# Patient Record
Sex: FEMALE | NOT HISPANIC OR LATINO | Employment: FULL TIME | URBAN - METROPOLITAN AREA
[De-identification: names, ages, dates, MRNs, and addresses within clinical notes are randomized per-mention and may not be internally consistent; named-entity substitution may affect disease eponyms.]

---

## 2021-11-28 ENCOUNTER — HOSPITAL ENCOUNTER (INPATIENT)
Facility: CLINIC | Age: 36
LOS: 3 days | Discharge: HOME | End: 2021-12-01
Attending: EMERGENCY MEDICINE | Admitting: STUDENT IN AN ORGANIZED HEALTH CARE EDUCATION/TRAINING PROGRAM
Payer: COMMERCIAL

## 2021-11-28 ENCOUNTER — APPOINTMENT (OUTPATIENT)
Dept: CT IMAGING | Facility: CLINIC | Age: 36
End: 2021-11-28
Attending: EMERGENCY MEDICINE
Payer: COMMERCIAL

## 2021-11-28 DIAGNOSIS — D50.8 OTHER IRON DEFICIENCY ANEMIA: Primary | ICD-10-CM

## 2021-11-28 DIAGNOSIS — J12.82 PNEUMONIA DUE TO 2019 NOVEL CORONAVIRUS: ICD-10-CM

## 2021-11-28 DIAGNOSIS — J96.01 ACUTE RESPIRATORY FAILURE WITH HYPOXIA (H): ICD-10-CM

## 2021-11-28 DIAGNOSIS — U07.1 PNEUMONIA DUE TO 2019 NOVEL CORONAVIRUS: ICD-10-CM

## 2021-11-28 LAB
ABO/RH(D): NORMAL
ALBUMIN SERPL-MCNC: 3.2 G/DL (ref 3.4–5)
ALBUMIN UR-MCNC: 30 MG/DL
ALP SERPL-CCNC: 42 U/L (ref 40–150)
ALT SERPL W P-5'-P-CCNC: 25 U/L (ref 0–50)
ANION GAP SERPL CALCULATED.3IONS-SCNC: 7 MMOL/L (ref 3–14)
APPEARANCE UR: CLEAR
AST SERPL W P-5'-P-CCNC: 33 U/L (ref 0–45)
BASOPHILS # BLD AUTO: 0 10E3/UL (ref 0–0.2)
BASOPHILS NFR BLD AUTO: 0 %
BILIRUB DIRECT SERPL-MCNC: 0.2 MG/DL (ref 0–0.2)
BILIRUB SERPL-MCNC: 0.4 MG/DL (ref 0.2–1.3)
BILIRUB UR QL STRIP: NEGATIVE
BUN SERPL-MCNC: 5 MG/DL (ref 7–30)
CALCIUM SERPL-MCNC: 8.1 MG/DL (ref 8.5–10.1)
CHLORIDE BLD-SCNC: 103 MMOL/L (ref 94–109)
CO2 SERPL-SCNC: 30 MMOL/L (ref 20–32)
COLOR UR AUTO: ABNORMAL
CREAT SERPL-MCNC: 0.7 MG/DL (ref 0.52–1.04)
CRP SERPL-MCNC: 95.7 MG/L (ref 0–8)
D DIMER PPP FEU-MCNC: 1.38 UG/ML FEU (ref 0–0.5)
EOSINOPHIL # BLD AUTO: 0 10E3/UL (ref 0–0.7)
EOSINOPHIL NFR BLD AUTO: 0 %
ERYTHROCYTE [DISTWIDTH] IN BLOOD BY AUTOMATED COUNT: 18.2 % (ref 10–15)
FLUAV RNA SPEC QL NAA+PROBE: NEGATIVE
FLUBV RNA RESP QL NAA+PROBE: NEGATIVE
GFR SERPL CREATININE-BSD FRML MDRD: >90 ML/MIN/1.73M2
GLUCOSE BLD-MCNC: 86 MG/DL (ref 70–99)
GLUCOSE UR STRIP-MCNC: NEGATIVE MG/DL
HCG UR QL: NEGATIVE
HCT VFR BLD AUTO: 28.1 % (ref 35–47)
HGB BLD-MCNC: 8.1 G/DL (ref 11.7–15.7)
HGB UR QL STRIP: ABNORMAL
IMM GRANULOCYTES # BLD: 0 10E3/UL
IMM GRANULOCYTES NFR BLD: 1 %
IRON SATN MFR SERPL: 4 % (ref 15–46)
IRON SERPL-MCNC: 12 UG/DL (ref 35–180)
KETONES UR STRIP-MCNC: NEGATIVE MG/DL
LEUKOCYTE ESTERASE UR QL STRIP: NEGATIVE
LYMPHOCYTES # BLD AUTO: 1.4 10E3/UL (ref 0.8–5.3)
LYMPHOCYTES NFR BLD AUTO: 23 %
MCH RBC QN AUTO: 21 PG (ref 26.5–33)
MCHC RBC AUTO-ENTMCNC: 28.8 G/DL (ref 31.5–36.5)
MCV RBC AUTO: 73 FL (ref 78–100)
MONOCYTES # BLD AUTO: 0.3 10E3/UL (ref 0–1.3)
MONOCYTES NFR BLD AUTO: 5 %
NEUTROPHILS # BLD AUTO: 4.3 10E3/UL (ref 1.6–8.3)
NEUTROPHILS NFR BLD AUTO: 71 %
NITRATE UR QL: NEGATIVE
NRBC # BLD AUTO: 0 10E3/UL
NRBC BLD AUTO-RTO: 0 /100
PH UR STRIP: 7 [PH] (ref 5–7)
PLATELET # BLD AUTO: 269 10E3/UL (ref 150–450)
POTASSIUM BLD-SCNC: 3.2 MMOL/L (ref 3.4–5.3)
PROT SERPL-MCNC: 8 G/DL (ref 6.8–8.8)
RBC # BLD AUTO: 3.86 10E6/UL (ref 3.8–5.2)
RBC URINE: <1 /HPF
SARS-COV-2 RNA RESP QL NAA+PROBE: POSITIVE
SODIUM SERPL-SCNC: 140 MMOL/L (ref 133–144)
SP GR UR STRIP: 1.01 (ref 1–1.03)
SPECIMEN EXPIRATION DATE: NORMAL
SQUAMOUS EPITHELIAL: 1 /HPF
TIBC SERPL-MCNC: 332 UG/DL (ref 240–430)
TROPONIN I SERPL-MCNC: <0.015 UG/L (ref 0–0.04)
UROBILINOGEN UR STRIP-MCNC: NORMAL MG/DL
WBC # BLD AUTO: 6 10E3/UL (ref 4–11)
WBC URINE: 1 /HPF

## 2021-11-28 PROCEDURE — 81025 URINE PREGNANCY TEST: CPT | Performed by: EMERGENCY MEDICINE

## 2021-11-28 PROCEDURE — 36415 COLL VENOUS BLD VENIPUNCTURE: CPT | Performed by: EMERGENCY MEDICINE

## 2021-11-28 PROCEDURE — 250N000011 HC RX IP 250 OP 636: Performed by: EMERGENCY MEDICINE

## 2021-11-28 PROCEDURE — 250N000009 HC RX 250: Performed by: EMERGENCY MEDICINE

## 2021-11-28 PROCEDURE — 84484 ASSAY OF TROPONIN QUANT: CPT | Performed by: STUDENT IN AN ORGANIZED HEALTH CARE EDUCATION/TRAINING PROGRAM

## 2021-11-28 PROCEDURE — 87636 SARSCOV2 & INF A&B AMP PRB: CPT | Performed by: EMERGENCY MEDICINE

## 2021-11-28 PROCEDURE — 85025 COMPLETE CBC W/AUTO DIFF WBC: CPT | Performed by: EMERGENCY MEDICINE

## 2021-11-28 PROCEDURE — 85379 FIBRIN DEGRADATION QUANT: CPT | Performed by: EMERGENCY MEDICINE

## 2021-11-28 PROCEDURE — 99222 1ST HOSP IP/OBS MODERATE 55: CPT | Mod: AI | Performed by: STUDENT IN AN ORGANIZED HEALTH CARE EDUCATION/TRAINING PROGRAM

## 2021-11-28 PROCEDURE — 96365 THER/PROPH/DIAG IV INF INIT: CPT

## 2021-11-28 PROCEDURE — 86140 C-REACTIVE PROTEIN: CPT | Performed by: STUDENT IN AN ORGANIZED HEALTH CARE EDUCATION/TRAINING PROGRAM

## 2021-11-28 PROCEDURE — 96375 TX/PRO/DX INJ NEW DRUG ADDON: CPT

## 2021-11-28 PROCEDURE — 81001 URINALYSIS AUTO W/SCOPE: CPT | Performed by: EMERGENCY MEDICINE

## 2021-11-28 PROCEDURE — XW033E5 INTRODUCTION OF REMDESIVIR ANTI-INFECTIVE INTO PERIPHERAL VEIN, PERCUTANEOUS APPROACH, NEW TECHNOLOGY GROUP 5: ICD-10-PCS | Performed by: STUDENT IN AN ORGANIZED HEALTH CARE EDUCATION/TRAINING PROGRAM

## 2021-11-28 PROCEDURE — 120N000004 HC R&B MS OVERFLOW

## 2021-11-28 PROCEDURE — 83550 IRON BINDING TEST: CPT | Performed by: EMERGENCY MEDICINE

## 2021-11-28 PROCEDURE — 71275 CT ANGIOGRAPHY CHEST: CPT

## 2021-11-28 PROCEDURE — 80076 HEPATIC FUNCTION PANEL: CPT | Performed by: STUDENT IN AN ORGANIZED HEALTH CARE EDUCATION/TRAINING PROGRAM

## 2021-11-28 PROCEDURE — 99285 EMERGENCY DEPT VISIT HI MDM: CPT | Mod: 25

## 2021-11-28 PROCEDURE — C9803 HOPD COVID-19 SPEC COLLECT: HCPCS

## 2021-11-28 PROCEDURE — 80048 BASIC METABOLIC PNL TOTAL CA: CPT | Performed by: EMERGENCY MEDICINE

## 2021-11-28 RX ORDER — IOPAMIDOL 755 MG/ML
500 INJECTION, SOLUTION INTRAVASCULAR ONCE
Status: COMPLETED | OUTPATIENT
Start: 2021-11-28 | End: 2021-11-28

## 2021-11-28 RX ORDER — ALBUTEROL SULFATE 90 UG/1
2 AEROSOL, METERED RESPIRATORY (INHALATION) 4 TIMES DAILY
Status: DISCONTINUED | OUTPATIENT
Start: 2021-11-29 | End: 2021-11-29

## 2021-11-28 RX ORDER — FERROUS SULFATE 325(65) MG
325 TABLET ORAL DAILY
Status: DISCONTINUED | OUTPATIENT
Start: 2021-11-29 | End: 2021-12-01 | Stop reason: HOSPADM

## 2021-11-28 RX ORDER — ACETAMINOPHEN 325 MG/1
325-650 TABLET ORAL EVERY 6 HOURS PRN
Status: DISCONTINUED | OUTPATIENT
Start: 2021-11-28 | End: 2021-12-01 | Stop reason: HOSPADM

## 2021-11-28 RX ORDER — ACETAMINOPHEN 325 MG/1
325-650 TABLET ORAL EVERY 6 HOURS PRN
COMMUNITY

## 2021-11-28 RX ORDER — LIDOCAINE 40 MG/G
CREAM TOPICAL
Status: DISCONTINUED | OUTPATIENT
Start: 2021-11-28 | End: 2021-12-01 | Stop reason: HOSPADM

## 2021-11-28 RX ORDER — ONDANSETRON 2 MG/ML
4 INJECTION INTRAMUSCULAR; INTRAVENOUS EVERY 6 HOURS PRN
Status: DISCONTINUED | OUTPATIENT
Start: 2021-11-28 | End: 2021-12-01 | Stop reason: HOSPADM

## 2021-11-28 RX ORDER — KETOROLAC TROMETHAMINE 15 MG/ML
15 INJECTION, SOLUTION INTRAMUSCULAR; INTRAVENOUS ONCE
Status: COMPLETED | OUTPATIENT
Start: 2021-11-28 | End: 2021-11-28

## 2021-11-28 RX ORDER — ONDANSETRON 4 MG/1
4 TABLET, ORALLY DISINTEGRATING ORAL EVERY 6 HOURS PRN
Status: DISCONTINUED | OUTPATIENT
Start: 2021-11-28 | End: 2021-12-01 | Stop reason: HOSPADM

## 2021-11-28 RX ORDER — DEXAMETHASONE SODIUM PHOSPHATE 10 MG/ML
6 INJECTION, SOLUTION INTRAMUSCULAR; INTRAVENOUS ONCE
Status: COMPLETED | OUTPATIENT
Start: 2021-11-28 | End: 2021-11-28

## 2021-11-28 RX ORDER — IBUPROFEN 200 MG
200 TABLET ORAL EVERY 4 HOURS PRN
Status: ON HOLD | COMMUNITY
End: 2021-12-01

## 2021-11-28 RX ORDER — ALBUTEROL SULFATE 90 UG/1
2 AEROSOL, METERED RESPIRATORY (INHALATION) EVERY 4 HOURS PRN
Status: DISCONTINUED | OUTPATIENT
Start: 2021-11-28 | End: 2021-12-01 | Stop reason: HOSPADM

## 2021-11-28 RX ORDER — FAMOTIDINE 20 MG/1
20 TABLET, FILM COATED ORAL 2 TIMES DAILY
Status: DISCONTINUED | OUTPATIENT
Start: 2021-11-28 | End: 2021-12-01 | Stop reason: HOSPADM

## 2021-11-28 RX ADMIN — KETOROLAC TROMETHAMINE 15 MG: 15 INJECTION, SOLUTION INTRAMUSCULAR; INTRAVENOUS at 19:40

## 2021-11-28 RX ADMIN — IOPAMIDOL 80 ML: 755 INJECTION, SOLUTION INTRAVENOUS at 19:00

## 2021-11-28 RX ADMIN — DEXAMETHASONE SODIUM PHOSPHATE 6 MG: 10 INJECTION INTRAMUSCULAR; INTRAVENOUS at 18:16

## 2021-11-28 RX ADMIN — SODIUM CHLORIDE 100 ML: 9 INJECTION, SOLUTION INTRAVENOUS at 19:00

## 2021-11-28 ASSESSMENT — ENCOUNTER SYMPTOMS
MYALGIAS: 1
FEVER: 1
BACK PAIN: 1
COUGH: 1
SHORTNESS OF BREATH: 1

## 2021-11-28 ASSESSMENT — ACTIVITIES OF DAILY LIVING (ADL)
ADLS_ACUITY_SCORE: 12
ADLS_ACUITY_SCORE: 12

## 2021-11-28 NOTE — LETTER
[place] [address] [place] [address]  [phone] December 1, 2021    RE:  Nola Srinivasan                                                                                                                                                       [address] [facility] [address]  [place] [address]            To whom it may concern:    Nola Srinivasan is under my professional care for    Pneumonia due to 2019 novel coronavirus  Acute respiratory failure with hypoxia (H)  Other iron deficiency anemia She  may return to work with the following: The employee is UNABLE to return to work until completes the quarantine time at home.  Total of 14 days from 11/28/2021.  She was under my care during hospitalization from 11/28/2021 to 12/1/2021      Sincerely,        Manisha Howard MD

## 2021-11-28 NOTE — ED PROVIDER NOTES
History   Chief Complaint:  Low oxygen      HPI   Nola Srinivasan is a 36 year old female who presents with low oxygen and cold symptoms. The patient states that for the past 6 days she has been experiencing fever, shortness of breath, cough, and myalgias. She states that her mother passed away from COVID-19 10 days ago. She mentions that her friends and family gathered after her mother's passing and she is unsure if anyone was sick. She attempted to take ibuprofen and Tylenol with mild relief. She also has been having low to mid back pain for the past 2-3 days and mentions that it feels similar to her pneumonia diagnosis in the past. The pain is triggered when coughing and no changes when she turns to the side. She visited Urgent Care today and they found her O2 stats to be in the 80s, so she was sent to the ER. Denies any history of blood clots or use of birth control. Last period is today. Of note, the patient is not vaccinated against COVID-19.      Review of Systems   Constitutional: Positive for fever.   Respiratory: Positive for cough and shortness of breath.    Musculoskeletal: Positive for back pain and myalgias.   All other systems reviewed and are negative.        Allergies:  Penicillin G    Medications:  The patient is not currently taking any prescribed medications.    Past Medical History:     The patient denies past medical history.       Social History:  Patient presents alone     Physical Exam     Patient Vitals for the past 24 hrs:   BP Temp Temp src Pulse Resp SpO2 Weight   11/28/21 2000 116/68 -- -- 76 -- 90 % --   11/28/21 1945 113/71 -- -- -- -- 93 % --   11/28/21 1848 -- -- -- -- -- 94 % --   11/28/21 1845 122/73 -- -- -- -- -- --   11/28/21 1815 131/79 -- -- -- -- 92 % --   11/28/21 1800 113/71 -- -- 80 -- 96 % --   11/28/21 1745 115/72 -- -- -- -- 96 % --   11/28/21 1720 130/82 -- -- 86 -- 95 % --   11/28/21 1712 -- -- -- -- -- 94 % --   11/28/21 1711 -- -- -- -- -- (!) 84 % --   11/28/21  1710 132/78 -- -- 83 -- -- --   11/28/21 1630 (!) 143/84 -- -- -- -- 96 % --   11/28/21 1628 -- 98.4  F (36.9  C) Oral -- -- 96 % --   11/28/21 1625 (!) 143/84 -- -- 93 -- -- --   11/28/21 1610 -- -- -- -- -- 98 % --   11/28/21 1609 -- -- -- -- -- -- 89.8 kg (198 lb)   11/28/21 1608 (!) 149/87 97.3  F (36.3  C) Temporal 94 20 (!) 85 % --       Physical Exam  Vitals reviewed.   Constitutional:       Appearance: She is obese.   HENT:      Head: Normocephalic.      Mouth/Throat:      Mouth: Mucous membranes are moist.   Eyes:      Pupils: Pupils are equal, round, and reactive to light.   Cardiovascular:      Rate and Rhythm: Normal rate and regular rhythm.   Pulmonary:      Effort: Pulmonary effort is normal.      Breath sounds: Normal breath sounds.   Musculoskeletal:         General: Normal range of motion.   Skin:     General: Skin is warm.      Capillary Refill: Capillary refill takes less than 2 seconds.   Neurological:      General: No focal deficit present.      Mental Status: She is alert.   Psychiatric:      Comments: Anxious recent loss of mother due to COVID-19.           Emergency Department Course     Imaging:  CT Chest Pulmonary Embolism w Contrast   Final Result   IMPRESSION:   1.  Diffuse moderate to extensive areas of groundglass opacity and developing consolidation throughout both hemithoraces most prominent towards the lower lung fields most consistent with COVID pneumonia. Follow-up in 3 months recommended to ensure    resolution and exclude other pathology.   2.  No pulmonary embolus aortic aneurysm or dissection.      Commonly reported imaging features of (COVID-19) pneumonia are present. Influenza pneumonia and organizing pneumonia as can be seen in the setting of drug toxicity and connective tissue disease can cause a similar imaging pattern.        Report per radiology    Laboratory:  Labs Ordered and Resulted from Time of ED Arrival to Time of ED Departure   BASIC METABOLIC PANEL - Abnormal        Result Value    Sodium 140      Potassium 3.2 (*)     Chloride 103      Carbon Dioxide (CO2) 30      Anion Gap 7      Urea Nitrogen 5 (*)     Creatinine 0.70      Calcium 8.1 (*)     Glucose 86      GFR Estimate >90     D DIMER QUANTITATIVE - Abnormal    D-Dimer Quantitative 1.38 (*)    ROUTINE UA WITH MICROSCOPIC REFLEX TO CULTURE - Abnormal    Color Urine Light Yellow      Appearance Urine Clear      Glucose Urine Negative      Bilirubin Urine Negative      Ketones Urine Negative      Specific Gravity Urine 1.010      Blood Urine Trace (*)     pH Urine 7.0      Protein Albumin Urine 30  (*)     Urobilinogen Urine Normal      Nitrite Urine Negative      Leukocyte Esterase Urine Negative      RBC Urine <1      WBC Urine 1      Squamous Epithelials Urine 1     INFLUENZA A/B & SARS-COV2 PCR MULTIPLEX - Abnormal    Influenza A PCR Negative      Influenza B PCR Negative      SARS CoV2 PCR Positive (*)    CBC WITH PLATELETS AND DIFFERENTIAL - Abnormal    WBC Count 6.0      RBC Count 3.86      Hemoglobin 8.1 (*)     Hematocrit 28.1 (*)     MCV 73 (*)     MCH 21.0 (*)     MCHC 28.8 (*)     RDW 18.2 (*)     Platelet Count 269      % Neutrophils 71      % Lymphocytes 23      % Monocytes 5      % Eosinophils 0      % Basophils 0      % Immature Granulocytes 1      NRBCs per 100 WBC 0      Absolute Neutrophils 4.3      Absolute Lymphocytes 1.4      Absolute Monocytes 0.3      Absolute Eosinophils 0.0      Absolute Basophils 0.0      Absolute Immature Granulocytes 0.0      Absolute NRBCs 0.0     IRON AND IRON BINDING CAPACITY - Abnormal    Iron 12 (*)     Iron Binding Capacity 332      Iron Sat Index 4 (*)    HEPATIC FUNCTION PANEL - Abnormal    Bilirubin Total 0.4      Bilirubin Direct 0.2      Protein Total 8.0      Albumin 3.2 (*)     Alkaline Phosphatase 42      AST 33      ALT 25     HCG QUALITATIVE URINE - Normal    hCG Urine Qualitative Negative         Emergency Department Course:  Reviewed:  I reviewed nursing  notes, vitals and past medical history    Assessments:  1716 I obtained history and examined the patient as noted above.   2019 I rechecked the patient and explained findings.     Consults:  2102 I spoke with Dr. Mares, hospitalist, about the patient's findings and will be accepted for admission.    Interventions:  1816 Decadron, 6 mg, IV     Disposition:  The patient was admitted to the hospital under the care of Dr. Mares.     Impression & Plan     CMS Diagnoses: None    Medical Decision Making:  Patient presents with hypoxemia in the setting of COVID-19.  Patient is unvaccinated and arrives with sats in the 80s.  D-dimer is elevated CT is performed evaluate for VTE in the setting of COVID-19 this was negative but does identify diffuse infiltrates.  Based on her presentation recommend steroids and admission patient is on day 6 or 7 as far as we can tell based on her length of time of illness.  Clinical concerns about worsening and deterioration and therefore do not feel patient at this time is a candidate for discharge home on home oxygen.  Patient was explained these findings were admitted as an inpatient.      Diagnosis:    ICD-10-CM    1. Pneumonia due to 2019 novel coronavirus  U07.1     J12.82    2. Acute respiratory failure with hypoxia (H)  J96.01      Scribe Disclosure:  I, Sarita Zia, am serving as a scribe at 4:41 PM on 11/28/2021 to document services personally performed by Guilherme Powers MD based on my observations and the provider's statements to me.            Guilherme Powers MD  11/30/21 0715

## 2021-11-28 NOTE — ED TRIAGE NOTES
Patient has had cold symptoms for about one week. Patient went to UC and found O2 to be 86% so patient was referred to ER. Patient O2 found to be 85% on room air in triage - placed on 2L O2 with quick recovery to 100%. She has had a fever of 101 PTA. Patient states she has not been vaccinated for COVID 19. ABCs intact.     Last took Tylenol at 1030  Last took Ibuprofen at 0400

## 2021-11-29 LAB
ABO/RH(D): NORMAL
ALBUMIN SERPL-MCNC: 2.9 G/DL (ref 3.4–5)
ALP SERPL-CCNC: 39 U/L (ref 40–150)
ALT SERPL W P-5'-P-CCNC: 23 U/L (ref 0–50)
ANION GAP SERPL CALCULATED.3IONS-SCNC: 3 MMOL/L (ref 3–14)
ANION GAP SERPL CALCULATED.3IONS-SCNC: 5 MMOL/L (ref 3–14)
AST SERPL W P-5'-P-CCNC: 32 U/L (ref 0–45)
BASOPHILS # BLD MANUAL: 0 10E3/UL (ref 0–0.2)
BASOPHILS NFR BLD MANUAL: 0 %
BILIRUB SERPL-MCNC: 0.5 MG/DL (ref 0.2–1.3)
BUN SERPL-MCNC: 6 MG/DL (ref 7–30)
BUN SERPL-MCNC: 7 MG/DL (ref 7–30)
CALCIUM SERPL-MCNC: 7.6 MG/DL (ref 8.5–10.1)
CALCIUM SERPL-MCNC: 8 MG/DL (ref 8.5–10.1)
CHLORIDE BLD-SCNC: 104 MMOL/L (ref 94–109)
CHLORIDE BLD-SCNC: 105 MMOL/L (ref 94–109)
CO2 SERPL-SCNC: 28 MMOL/L (ref 20–32)
CO2 SERPL-SCNC: 32 MMOL/L (ref 20–32)
CREAT SERPL-MCNC: 0.59 MG/DL (ref 0.52–1.04)
CREAT SERPL-MCNC: 0.63 MG/DL (ref 0.52–1.04)
CRP SERPL-MCNC: 81.8 MG/L (ref 0–8)
D DIMER PPP FEU-MCNC: 1.13 UG/ML FEU (ref 0–0.5)
EOSINOPHIL # BLD MANUAL: 0 10E3/UL (ref 0–0.7)
EOSINOPHIL NFR BLD MANUAL: 0 %
ERYTHROCYTE [DISTWIDTH] IN BLOOD BY AUTOMATED COUNT: 18.2 % (ref 10–15)
ERYTHROCYTE [DISTWIDTH] IN BLOOD BY AUTOMATED COUNT: 18.3 % (ref 10–15)
GFR SERPL CREATININE-BSD FRML MDRD: >90 ML/MIN/1.73M2
GFR SERPL CREATININE-BSD FRML MDRD: >90 ML/MIN/1.73M2
GLUCOSE BLD-MCNC: 120 MG/DL (ref 70–99)
GLUCOSE BLD-MCNC: 157 MG/DL (ref 70–99)
HCT VFR BLD AUTO: 26.3 % (ref 35–47)
HCT VFR BLD AUTO: 27.4 % (ref 35–47)
HGB BLD-MCNC: 7.3 G/DL (ref 11.7–15.7)
HGB BLD-MCNC: 7.6 G/DL (ref 11.7–15.7)
LYMPHOCYTES # BLD MANUAL: 0.6 10E3/UL (ref 0.8–5.3)
LYMPHOCYTES NFR BLD MANUAL: 12 %
MCH RBC QN AUTO: 20.5 PG (ref 26.5–33)
MCH RBC QN AUTO: 20.6 PG (ref 26.5–33)
MCHC RBC AUTO-ENTMCNC: 27.7 G/DL (ref 31.5–36.5)
MCHC RBC AUTO-ENTMCNC: 27.8 G/DL (ref 31.5–36.5)
MCV RBC AUTO: 74 FL (ref 78–100)
MCV RBC AUTO: 74 FL (ref 78–100)
MONOCYTES # BLD MANUAL: 0.1 10E3/UL (ref 0–1.3)
MONOCYTES NFR BLD MANUAL: 2 %
NEUTROPHILS # BLD MANUAL: 4 10E3/UL (ref 1.6–8.3)
NEUTROPHILS NFR BLD MANUAL: 86 %
PLAT MORPH BLD: ABNORMAL
PLATELET # BLD AUTO: 262 10E3/UL (ref 150–450)
PLATELET # BLD AUTO: 267 10E3/UL (ref 150–450)
POTASSIUM BLD-SCNC: 3.3 MMOL/L (ref 3.4–5.3)
POTASSIUM BLD-SCNC: 3.4 MMOL/L (ref 3.4–5.3)
POTASSIUM BLD-SCNC: 3.5 MMOL/L (ref 3.4–5.3)
POTASSIUM BLD-SCNC: 3.7 MMOL/L (ref 3.4–5.3)
PROT SERPL-MCNC: 7.4 G/DL (ref 6.8–8.8)
RBC # BLD AUTO: 3.55 10E6/UL (ref 3.8–5.2)
RBC # BLD AUTO: 3.7 10E6/UL (ref 3.8–5.2)
RBC MORPH BLD: ABNORMAL
SODIUM SERPL-SCNC: 138 MMOL/L (ref 133–144)
SODIUM SERPL-SCNC: 139 MMOL/L (ref 133–144)
SPECIMEN EXPIRATION DATE: NORMAL
TROPONIN I SERPL HS-MCNC: 8 NG/L
WBC # BLD AUTO: 3.6 10E3/UL (ref 4–11)
WBC # BLD AUTO: 4.7 10E3/UL (ref 4–11)

## 2021-11-29 PROCEDURE — 85027 COMPLETE CBC AUTOMATED: CPT | Performed by: STUDENT IN AN ORGANIZED HEALTH CARE EDUCATION/TRAINING PROGRAM

## 2021-11-29 PROCEDURE — 36415 COLL VENOUS BLD VENIPUNCTURE: CPT | Performed by: STUDENT IN AN ORGANIZED HEALTH CARE EDUCATION/TRAINING PROGRAM

## 2021-11-29 PROCEDURE — 250N000013 HC RX MED GY IP 250 OP 250 PS 637: Performed by: STUDENT IN AN ORGANIZED HEALTH CARE EDUCATION/TRAINING PROGRAM

## 2021-11-29 PROCEDURE — 86901 BLOOD TYPING SEROLOGIC RH(D): CPT | Performed by: EMERGENCY MEDICINE

## 2021-11-29 PROCEDURE — 250N000009 HC RX 250: Performed by: STUDENT IN AN ORGANIZED HEALTH CARE EDUCATION/TRAINING PROGRAM

## 2021-11-29 PROCEDURE — 99233 SBSQ HOSP IP/OBS HIGH 50: CPT | Performed by: INTERNAL MEDICINE

## 2021-11-29 PROCEDURE — 250N000013 HC RX MED GY IP 250 OP 250 PS 637: Performed by: INTERNAL MEDICINE

## 2021-11-29 PROCEDURE — 999N000157 HC STATISTIC RCP TIME EA 10 MIN

## 2021-11-29 PROCEDURE — 120N000004 HC R&B MS OVERFLOW

## 2021-11-29 PROCEDURE — 94640 AIRWAY INHALATION TREATMENT: CPT | Mod: 76

## 2021-11-29 PROCEDURE — 94640 AIRWAY INHALATION TREATMENT: CPT

## 2021-11-29 PROCEDURE — 85379 FIBRIN DEGRADATION QUANT: CPT | Performed by: STUDENT IN AN ORGANIZED HEALTH CARE EDUCATION/TRAINING PROGRAM

## 2021-11-29 PROCEDURE — 36415 COLL VENOUS BLD VENIPUNCTURE: CPT | Performed by: INTERNAL MEDICINE

## 2021-11-29 PROCEDURE — 250N000012 HC RX MED GY IP 250 OP 636 PS 637: Performed by: STUDENT IN AN ORGANIZED HEALTH CARE EDUCATION/TRAINING PROGRAM

## 2021-11-29 PROCEDURE — 258N000003 HC RX IP 258 OP 636: Performed by: STUDENT IN AN ORGANIZED HEALTH CARE EDUCATION/TRAINING PROGRAM

## 2021-11-29 PROCEDURE — 86901 BLOOD TYPING SEROLOGIC RH(D): CPT | Performed by: STUDENT IN AN ORGANIZED HEALTH CARE EDUCATION/TRAINING PROGRAM

## 2021-11-29 PROCEDURE — 80053 COMPREHEN METABOLIC PANEL: CPT | Performed by: STUDENT IN AN ORGANIZED HEALTH CARE EDUCATION/TRAINING PROGRAM

## 2021-11-29 PROCEDURE — 84132 ASSAY OF SERUM POTASSIUM: CPT | Performed by: INTERNAL MEDICINE

## 2021-11-29 PROCEDURE — 86140 C-REACTIVE PROTEIN: CPT | Performed by: STUDENT IN AN ORGANIZED HEALTH CARE EDUCATION/TRAINING PROGRAM

## 2021-11-29 PROCEDURE — 250N000011 HC RX IP 250 OP 636: Performed by: STUDENT IN AN ORGANIZED HEALTH CARE EDUCATION/TRAINING PROGRAM

## 2021-11-29 RX ORDER — POTASSIUM CHLORIDE 1500 MG/1
40 TABLET, EXTENDED RELEASE ORAL ONCE
Status: DISCONTINUED | OUTPATIENT
Start: 2021-11-29 | End: 2021-11-29

## 2021-11-29 RX ORDER — POTASSIUM CHLORIDE 1500 MG/1
40 TABLET, EXTENDED RELEASE ORAL ONCE
Status: COMPLETED | OUTPATIENT
Start: 2021-11-29 | End: 2021-11-29

## 2021-11-29 RX ADMIN — SODIUM CHLORIDE 50 ML: 9 INJECTION, SOLUTION INTRAVENOUS at 18:30

## 2021-11-29 RX ADMIN — REMDESIVIR 100 MG: 100 INJECTION, POWDER, LYOPHILIZED, FOR SOLUTION INTRAVENOUS at 16:42

## 2021-11-29 RX ADMIN — FAMOTIDINE 20 MG: 20 TABLET, FILM COATED ORAL at 00:03

## 2021-11-29 RX ADMIN — SODIUM CHLORIDE 50 ML: 9 INJECTION, SOLUTION INTRAVENOUS at 01:43

## 2021-11-29 RX ADMIN — ALBUTEROL SULFATE 2 PUFF: 90 AEROSOL, METERED RESPIRATORY (INHALATION) at 16:12

## 2021-11-29 RX ADMIN — DEXAMETHASONE 6 MG: 2 TABLET ORAL at 12:52

## 2021-11-29 RX ADMIN — REMDESIVIR 200 MG: 100 INJECTION, POWDER, LYOPHILIZED, FOR SOLUTION INTRAVENOUS at 00:03

## 2021-11-29 RX ADMIN — ALBUTEROL SULFATE 2 PUFF: 90 AEROSOL, METERED RESPIRATORY (INHALATION) at 12:27

## 2021-11-29 RX ADMIN — FAMOTIDINE 20 MG: 20 TABLET, FILM COATED ORAL at 08:26

## 2021-11-29 RX ADMIN — ACETAMINOPHEN 650 MG: 325 TABLET, FILM COATED ORAL at 17:00

## 2021-11-29 RX ADMIN — ALBUTEROL SULFATE 2 PUFF: 90 AEROSOL, METERED RESPIRATORY (INHALATION) at 20:49

## 2021-11-29 RX ADMIN — FERROUS SULFATE TAB 325 MG (65 MG ELEMENTAL FE) 325 MG: 325 (65 FE) TAB at 08:26

## 2021-11-29 RX ADMIN — ENOXAPARIN SODIUM 40 MG: 40 INJECTION SUBCUTANEOUS at 00:03

## 2021-11-29 RX ADMIN — FAMOTIDINE 20 MG: 20 TABLET, FILM COATED ORAL at 20:47

## 2021-11-29 RX ADMIN — POTASSIUM CHLORIDE 40 MEQ: 1500 TABLET, EXTENDED RELEASE ORAL at 14:44

## 2021-11-29 RX ADMIN — ACETAMINOPHEN 650 MG: 325 TABLET, FILM COATED ORAL at 06:21

## 2021-11-29 ASSESSMENT — ACTIVITIES OF DAILY LIVING (ADL)
ADLS_ACUITY_SCORE: 12
ADLS_ACUITY_SCORE: 3
ADLS_ACUITY_SCORE: 12
ADLS_ACUITY_SCORE: 3

## 2021-11-29 NOTE — PLAN OF CARE
Vital Signs: VSS. Afebrile. Weaned from 4L NC to 1L NC, sats 91-95%  Pain/Comfort: Having some lower back pain, denies need for pain medication.   Assessment: Lung sounds mostly clear, diminished bases. Slightly coarse upper left lobe.  Diet: Regular diet. Eating and drinking well.  Output: Voiding  Activity/Ambulation: Up to bathroom

## 2021-11-29 NOTE — ED NOTES
Pt transferred from ED bed 21 to ED bed 40.  Tolerated transfer. Resting in bed no further needs.

## 2021-11-29 NOTE — H&P
[facility]    History and Physical - Hospitalist Service       Date of Admission:  11/28/2021    Assessment & Plan      Nola Srinivasan is a 36 year old female without significant past medical history who was unvaccinated for COVID-19 and admitted on 11/28/2021 with acute hypoxic respiratory failure secondary to COVID-19 pneumonia.    Acute Hypoxic Respiratory Failure  COVID-19 Pneumonia  Exposure occurred on 11/18/2021 after her mother passed away from COVID-19 pneumonia and acute hypoxic respiratory failure.  She began experiencing symptoms 11/22/2021.  Hypoxic to mid-80s ORA, requiring 2 L via nasal cannula to maintain saturations above 90%.  D-dimer was elevated to 1.38; CT PE negative for thrombus but with bilateral groundglass opacities consistent with COVID-19 pneumonia.  Dexamethasone initiated in the emergency department, will continue on admission.  Will check LFTs and if within normal limits will initiate remdesivir.  -Dexamethasone 6 mg x 10 days total  -Continuous oximetry  -Supplemental oxygen as needed  -LFTs pending  -CRP pending    D-dimer Elevation  D-dimer 1.38 on admission with negative PE study.  Most likely elevated in the setting of inflammatory pneumonia as above.  Trending.    Chronic Iron Deficiency Anemia  Hgb 8.1, MCH 21.0, MCV 73.  Iron saturation low.  Will provide supplementation.    Opioid Use Disorder: On Methadone 90mg PTA. Will need dose tomorrow.  Hypokalemia: K3.2 on arrival.  Replacing as needed  Hypocalcemia: Calcium 8.1 on arrival.  Replacing as needed       Diet:  Regular  DVT Prophylaxis: Enoxaparin (Lovenox) SQ  Barnes Catheter: Not present  Central Lines: None  Code Status:  Full  Expected discharge: Likely 2 to 3 days pending clinical stabilization.     The patient's care was discussed with the Bedside Nurse and Patient.    Fred Mares MD  [facility]  Securely message with the Vocera Web Console (learn more here)  Text page via  "[employer] Paging/Directory    ___________________________________________________________________    Chief Complaint   \"I got cold symptoms 6 days ago\"    History is obtained from the patient    History of Present Illness   Nola Srinivasan is a 36 year old female without significant past medical history who is unvaccinated for COVID-19 presenting to [facility] on 11/28/2021 with symptoms of URI, back pain, and hypoxia.    Patient stated that her mother recently passed away from COVID-19 pneumonia and she was visiting with family thereafter.  Approximately 4 days after she developed symptoms of \"a cold\" which she described as rhinorrhea, cough, sore throat.  She initially thought that this may have been resultant from sitting around the campfire and inhaling smoke.  This later progressed to shortness of breath, worse with exertion, and mid-back pain.  She then became concerned about pneumonia and presented to urgent care on 11/28/2021 where she was found to be hypoxic to mid-80s and was sent to the emergency department.  She was diagnosed with COVID-19 pneumonia and acute hypoxic respiratory failure, requiring 2 L via nasal cannula to maintain saturations greater than 90%.  She was started on dexamethasone and admitted for further cares.    Denies any chest pain, but does note that she has had back pain worse with coughing.  Cough is nonproductive.  She has had shortness of breath.  Denies any nausea, vomiting, diarrhea.  Has felt feverish, without chills.    Review of Systems    A full 10+ point review of systems was performed and found to be negative with the exception of those items noted in the HPI above.    Past Medical History    I have reviewed this patient's medical history and updated it with pertinent information if needed.   No past medical history on file.    Past Surgical History   I have reviewed this patient's surgical history and updated it with pertinent information if needed.  No past " surgical history on file.    Social History   I have reviewed this patient's social history and updated it with pertinent information if needed.  Social History     Tobacco Use     Smoking status: Not on file     Smokeless tobacco: Not on file   Substance Use Topics     Alcohol use: Not on file     Drug use: Not on file       Family History   Mother recently  from COVID-19 pneumonia    Prior to Admission Medications   Prior to Admission Medications   Prescriptions Last Dose Informant Patient Reported? Taking?   Pseudoephedrine-DM-GG-APAP (DAYQUIL LIQUICAPS OR) prn at prn Self Yes Yes   Sig: Take 2 capsules by mouth every 4 hours as needed   acetaminophen (TYLENOL) 325 MG tablet prn at prn Self Yes Yes   Sig: Take 325-650 mg by mouth every 6 hours as needed for mild pain   ibuprofen (ADVIL/MOTRIN) 200 MG tablet prn at prn Self Yes Yes   Sig: Take 200 mg by mouth every 4 hours as needed for mild pain      Facility-Administered Medications: None     Allergies   Allergies   Allergen Reactions     Penicillin G GI Disturbance       Physical Exam   Vital Signs: Temp: 98.4  F (36.9  C) Temp src: Oral BP: 116/68 Pulse: 76   Resp: 20 SpO2: 90 % O2 Device: Nasal cannula Oxygen Delivery: 2 LPM  Weight: 198 lbs 0 oz    General: Pleasant female resting comfortably in hospital bed.  Awake, alert, interactive.  HEENT: Normocephalic, atraumatic.  PERRL, EOMI.  Conjunctiva clear, sclerae anicteric.  Cardiac: Regular rate and rhythm without murmur, gallop, rub.  No peripheral edema.  Respiratory: Bibasilar crackles.  Normal work of breathing.  Nasal cannula in place.  Abdominal: NABS.  Soft, nontender, nondistended.  : Deferred.  Musculoskeletal: Moving all extremities appropriately.  Skin: Tattoo across chest.  No rashes or abrasions on exposed skin.  Neurologic: Alert and oriented x4.  Cranial nerves II through XII grossly intact.  Psychiatric: Normal mood and affect.    Data   All laboratory results and other diagnostic data  from the past 24 hours is available in Epic and has been personally reviewed.  I personally reviewed the chest CT image(s) showing bibasilar and mid-field GGO..    Recent Labs   Lab 11/28/21  1649   WBC 6.0   HGB 8.1*   MCV 73*         POTASSIUM 3.2*   CHLORIDE 103   CO2 30   BUN 5*   CR 0.70   ANIONGAP 7   MILLIE 8.1*   GLC 86

## 2021-11-29 NOTE — ED NOTES
.  [facility]  ED Nurse Handoff Report    Nola Srinivasan is a 36 year old female   ED Chief complaint: No chief complaint on file.  . ED Diagnosis:   Final diagnoses:   None     Allergies:   Allergies   Allergen Reactions     Penicillin G GI Disturbance       Code Status: Full Code  Activity level - Baseline/Home:  Independent. Activity Level - Current:   Stand by Assist. Lift room needed: No. Bariatric: No   Needed: No   Isolation: Yes. Infection: Not Applicable  COVID r/o and special precautions.     Vital Signs:   Vitals:    11/28/21 1800 11/28/21 1815 11/28/21 1845 11/28/21 1848   BP: 113/71 131/79 122/73    Pulse: 80      Resp:       Temp:       TempSrc:       SpO2: 96% 92%  94%   Weight:           Cardiac Rhythm:  ,      Pain level:    Patient confused: No. Patient Falls Risk: No.   Elimination Status: Has voided   Patient Report - Initial Complaint: a 36 year old female who presents with low oxygen and cold symptoms. The patient states that for the past 6 days she has been experiencing fever, shortness of breath, cough, and myalgias. She states that her mother passed away from COVID-19 10 days ago. She mentions that her friends and family gathered after her mother's passing and she is unsure if anyone was sick. She attempted to take ibuprofen and Tylenol with mild relief. She also has been having low to mid back pain for the past 2-3 days and mentions that it feels similar to her pneumonia diagnosis in the past. The pain is triggered when coughing and no changes when she turns to the side. She visited Urgent Care today and they found her O2 stats to be in the 80s, so she was sent to the ER. Denies any history of blood clots or use of birth control. Last period is today. Of note, the patient is not vaccinated against COVID-19.  .   Focused Assessment:   Respiratory Respiratory - Respiratory WDL: .WDL except; all (satting 84-85% on RA) Rhythm/Pattern, Respiratory: shortness of breath;  shallow; tachypnea  Cough Frequency: frequent  Cough Type: good; productive         Tests Performed: labs, UA, CT chest. Abnormal Results: .  Labs Ordered and Resulted from Time of ED Arrival to Time of ED Departure   BASIC METABOLIC PANEL - Abnormal       Result Value    Sodium 140      Potassium 3.2 (*)     Chloride 103      Carbon Dioxide (CO2) 30      Anion Gap 7      Urea Nitrogen 5 (*)     Creatinine 0.70      Calcium 8.1 (*)     Glucose 86      GFR Estimate >90     D DIMER QUANTITATIVE - Abnormal    D-Dimer Quantitative 1.38 (*)    ROUTINE UA WITH MICROSCOPIC REFLEX TO CULTURE - Abnormal    Color Urine Light Yellow      Appearance Urine Clear      Glucose Urine Negative      Bilirubin Urine Negative      Ketones Urine Negative      Specific Gravity Urine 1.010      Blood Urine Trace (*)     pH Urine 7.0      Protein Albumin Urine 30  (*)     Urobilinogen Urine Normal      Nitrite Urine Negative      Leukocyte Esterase Urine Negative      RBC Urine <1      WBC Urine 1      Squamous Epithelials Urine 1     INFLUENZA A/B & SARS-COV2 PCR MULTIPLEX - Abnormal    Influenza A PCR Negative      Influenza B PCR Negative      SARS CoV2 PCR Positive (*)    CBC WITH PLATELETS AND DIFFERENTIAL - Abnormal    WBC Count 6.0      RBC Count 3.86      Hemoglobin 8.1 (*)     Hematocrit 28.1 (*)     MCV 73 (*)     MCH 21.0 (*)     MCHC 28.8 (*)     RDW 18.2 (*)     Platelet Count 269      % Neutrophils 71      % Lymphocytes 23      % Monocytes 5      % Eosinophils 0      % Basophils 0      % Immature Granulocytes 1      NRBCs per 100 WBC 0      Absolute Neutrophils 4.3      Absolute Lymphocytes 1.4      Absolute Monocytes 0.3      Absolute Eosinophils 0.0      Absolute Basophils 0.0      Absolute Immature Granulocytes 0.0      Absolute NRBCs 0.0     IRON AND IRON BINDING CAPACITY - Abnormal    Iron 12 (*)     Iron Binding Capacity 332      Iron Sat Index 4 (*)    HCG QUALITATIVE URINE - Normal    hCG Urine Qualitative Negative         Treatments provided: see ED meds below  Family Comments: NA  OBS brochure/video discussed/provided to patient:  Yes  ED Medications:   Medications   ketorolac (TORADOL) injection 15 mg (has no administration in time range)   dexamethasone PF (DECADRON) injection 6 mg (6 mg Intravenous Given 11/28/21 1816)     Drips infusing:  No  For the majority of the shift, the patient's behavior Green. Interventions performed were NA.    Sepsis treatment initiated: No     Patient tested for COVID 19 prior to admission: YES, positive    ED Nurse Name/Phone Number: Karina Holbrook RN,   6:53 PM    RECEIVING UNIT ED HANDOFF REVIEW    Above ED Nurse Handoff Report was reviewed: Yes  Reviewed by: Dai Chacon RN on November 29, 2021 at 12:26 AM

## 2021-11-29 NOTE — PHARMACY-ADMISSION MEDICATION HISTORY
Admission medication history interview status for this patient is complete. See [facility] admission navigator for allergy information, prior to admission medications and immunization status.     Medication history interview done, indicate source(s): Patient  Medication history resources (including written lists, pill bottles, clinic record):None  Pharmacy: [facility] [place]    Changes made to PTA medication list:  Added: All  Changed: None  Reported as Not Taking: None  Removed: None    Actions taken by pharmacist (provider contacted, etc):None     Additional medication history information:None    Medication reconciliation/reorder completed by provider prior to medication history?  N   (Y/N)     Prior to Admission medications    Medication Sig Last Dose Taking? Auth Provider   acetaminophen (TYLENOL) 325 MG tablet Take 325-650 mg by mouth every 6 hours as needed for mild pain prn at prn Yes Unknown, Entered By History   ibuprofen (ADVIL/MOTRIN) 200 MG tablet Take 200 mg by mouth every 4 hours as needed for mild pain prn at prn Yes Unknown, Entered By History   Pseudoephedrine-DM-GG-APAP (DAYQUIL LIQUICAPS OR) Take 2 capsules by mouth every 4 hours as needed prn at prn Yes Unknown, Entered By History

## 2021-11-29 NOTE — PLAN OF CARE
Orientation: Alert and oriented x4  VSS. Afebrile. O2 sats 92-93% on 2 liters O2 via nasal cannula.   LS: Coarse bilaterally, diminished at bases.   Activity: As tolerated. Pt slept lightly towards the end of the shift.   Pain: Denies.  Updates/Plan: Will continue to monitor patients respiratory status. Continue with current cares and provide interventions as needed.     In addition: Patient awake at 0600 to ambulate to the bathroom. Patient ambulated with steady gait, but did become significantly short of breath upon return from bathroom. O2 sats 87-88% on 2 liters NC, increased to 3L then 4L to maintain O2 sats >90%. Patient reports she now has her menses and asked for feminine products. Patient requested tylenol for lower back discomfort. Offered heating pad in addition to tylenol, but patient declined heat at this time.

## 2021-11-29 NOTE — PLAN OF CARE
Patient arrived to floor at this time. Patient transported via cart and escorted by ED staff. Patient ambulated from cart to scale, and then to bed with minimal shortness of breath noted. Patient oriented to room, floor, and plan of care. PIV to right AC saline locked after NS bolus completed. Please refer to flowsheets for further information. Will continue to monitor respiratory status and provide intervention as needed.

## 2021-11-29 NOTE — PROGRESS NOTES
[facility]    Medicine Progress Note - Hospitalist Service       Date of Admission:  11/28/2021    Assessment & Plan           Ms. Srinivasan is a 36-year-old female with a past medical history significant for chronic methadone use.  Not vaccinated for COVID-19.  Began having symptoms 11/22.  Initial positive SARS-CoV-2 PCR on 11/28.  Hospitalized on 11/28.    1.  COVID-19 pneumonia.  With acute hypoxic respite failure.  -Continue dexamethasone 6 mg a day.  -Continue IV remdesivir with plan for 5-day course.  -Subcutaneous enoxaparin for DVT prophylaxis.  -Supplemental oxygen as needed.    2.  Acute hypoxic respiratory failure.  Due to COVID-19 pneumonia.  -Supplemental oxygen as needed.  -Treat COVID-19 as noted above.    3.  Reported chronic methadone use.  -Pain management team consult.  -Plan to restart methadone after dose verified by pharmacy.    4.  Hypokalemia.  Potassium initially 3.2.  -Potassium replacement protocol.    5.  Iron deficiency anemia.  Hemoglobin 7.3 today.  -Recheck CBC tomorrow.  -Continue ferrous sulfate.         Diet: Combination Diet Regular Diet Adult    DVT Prophylaxis: Enoxaparin (Lovenox) SQ  Barnes Catheter: Not present  Central Lines: None  Code Status: Full Code          Mohan Byrne DO  Hospitalist Service  [facility]  Securely message with the Vocera Web Console (learn more here)  Text page via garbs Paging/Directory        Clinically Significant Risk Factors Present on Admission                   ______________________________________________________________________    Interval History   Short of breath.  Coughing.  Having some back pain.  Denies chest pain, fevers, chills, nausea, or vomiting.    Data reviewed today: I reviewed all medications, new labs and imaging results over the last 24 hours.     Physical Exam   Vital Signs: Temp: 97.7  F (36.5  C) Temp src: Oral BP: 97/62 Pulse: 72   Resp: 20 SpO2: 91 % O2 Device: Nasal  cannula Oxygen Delivery: 1 LPM  Weight: 198 lbs 0 oz  Gen:  NAD, A&Ox3.  Eyes:  PERRL, sclera anicteric.  OP:  MMM, no lesions.  Neck:  Supple.  CV:  Regular, no murmurs.  Lung:  CTA b/l, normal effort.  Ab:  +BS, soft.  Skin:  Warm, dry to touch.  No rash.  Ext:  No pitting edema LE b/l.      Data   Recent Labs   Lab 11/29/21  0645 11/29/21  0046 11/28/21  1649   WBC 3.6* 4.7 6.0   HGB 7.3* 7.6* 8.1*   MCV 74* 74* 73*    262 269    139 140   POTASSIUM 3.4 3.5 3.2*   CHLORIDE 105 104 103   CO2 28 32 30   BUN 7 6* 5*   CR 0.59 0.63 0.70   ANIONGAP 5 3 7   MILLIE 8.0* 7.6* 8.1*   * 157* 86   ALBUMIN  --  2.9* 3.2*   PROTTOTAL  --  7.4 8.0   BILITOTAL  --  0.5 0.4   ALKPHOS  --  39* 42   ALT  --  23 25   AST  --  32 33   TROPONIN  --   --  <0.015

## 2021-11-30 LAB
ANION GAP SERPL CALCULATED.3IONS-SCNC: 7 MMOL/L (ref 3–14)
BUN SERPL-MCNC: 13 MG/DL (ref 7–30)
CALCIUM SERPL-MCNC: 7.9 MG/DL (ref 8.5–10.1)
CHLORIDE BLD-SCNC: 110 MMOL/L (ref 94–109)
CO2 SERPL-SCNC: 26 MMOL/L (ref 20–32)
CREAT SERPL-MCNC: 0.64 MG/DL (ref 0.52–1.04)
CRP SERPL-MCNC: 31.7 MG/L (ref 0–8)
D DIMER PPP FEU-MCNC: 0.64 UG/ML FEU (ref 0–0.5)
ERYTHROCYTE [DISTWIDTH] IN BLOOD BY AUTOMATED COUNT: 18.4 % (ref 10–15)
GFR SERPL CREATININE-BSD FRML MDRD: >90 ML/MIN/1.73M2
GLUCOSE BLD-MCNC: 113 MG/DL (ref 70–99)
HCT VFR BLD AUTO: 26.4 % (ref 35–47)
HGB BLD-MCNC: 7.5 G/DL (ref 11.7–15.7)
MCH RBC QN AUTO: 20.9 PG (ref 26.5–33)
MCHC RBC AUTO-ENTMCNC: 28.4 G/DL (ref 31.5–36.5)
MCV RBC AUTO: 74 FL (ref 78–100)
PLATELET # BLD AUTO: 284 10E3/UL (ref 150–450)
POTASSIUM BLD-SCNC: 3.9 MMOL/L (ref 3.4–5.3)
RBC # BLD AUTO: 3.58 10E6/UL (ref 3.8–5.2)
SODIUM SERPL-SCNC: 143 MMOL/L (ref 133–144)
WBC # BLD AUTO: 6.1 10E3/UL (ref 4–11)

## 2021-11-30 PROCEDURE — 258N000003 HC RX IP 258 OP 636: Performed by: STUDENT IN AN ORGANIZED HEALTH CARE EDUCATION/TRAINING PROGRAM

## 2021-11-30 PROCEDURE — 99222 1ST HOSP IP/OBS MODERATE 55: CPT | Performed by: CLINICAL NURSE SPECIALIST

## 2021-11-30 PROCEDURE — 250N000011 HC RX IP 250 OP 636: Performed by: STUDENT IN AN ORGANIZED HEALTH CARE EDUCATION/TRAINING PROGRAM

## 2021-11-30 PROCEDURE — 85027 COMPLETE CBC AUTOMATED: CPT | Performed by: STUDENT IN AN ORGANIZED HEALTH CARE EDUCATION/TRAINING PROGRAM

## 2021-11-30 PROCEDURE — 94762 N-INVAS EAR/PLS OXIMTRY CONT: CPT

## 2021-11-30 PROCEDURE — 80048 BASIC METABOLIC PNL TOTAL CA: CPT | Performed by: STUDENT IN AN ORGANIZED HEALTH CARE EDUCATION/TRAINING PROGRAM

## 2021-11-30 PROCEDURE — 85379 FIBRIN DEGRADATION QUANT: CPT | Performed by: STUDENT IN AN ORGANIZED HEALTH CARE EDUCATION/TRAINING PROGRAM

## 2021-11-30 PROCEDURE — 250N000012 HC RX MED GY IP 250 OP 636 PS 637: Performed by: STUDENT IN AN ORGANIZED HEALTH CARE EDUCATION/TRAINING PROGRAM

## 2021-11-30 PROCEDURE — 36415 COLL VENOUS BLD VENIPUNCTURE: CPT | Performed by: STUDENT IN AN ORGANIZED HEALTH CARE EDUCATION/TRAINING PROGRAM

## 2021-11-30 PROCEDURE — 250N000013 HC RX MED GY IP 250 OP 250 PS 637: Performed by: STUDENT IN AN ORGANIZED HEALTH CARE EDUCATION/TRAINING PROGRAM

## 2021-11-30 PROCEDURE — 120N000004 HC R&B MS OVERFLOW

## 2021-11-30 PROCEDURE — 86140 C-REACTIVE PROTEIN: CPT | Performed by: STUDENT IN AN ORGANIZED HEALTH CARE EDUCATION/TRAINING PROGRAM

## 2021-11-30 PROCEDURE — 250N000013 HC RX MED GY IP 250 OP 250 PS 637: Performed by: CLINICAL NURSE SPECIALIST

## 2021-11-30 PROCEDURE — 99233 SBSQ HOSP IP/OBS HIGH 50: CPT | Performed by: INTERNAL MEDICINE

## 2021-11-30 PROCEDURE — 250N000013 HC RX MED GY IP 250 OP 250 PS 637: Performed by: INTERNAL MEDICINE

## 2021-11-30 PROCEDURE — 250N000009 HC RX 250: Performed by: STUDENT IN AN ORGANIZED HEALTH CARE EDUCATION/TRAINING PROGRAM

## 2021-11-30 RX ORDER — NALOXONE HYDROCHLORIDE 0.4 MG/ML
0.2 INJECTION, SOLUTION INTRAMUSCULAR; INTRAVENOUS; SUBCUTANEOUS
Status: DISCONTINUED | OUTPATIENT
Start: 2021-11-30 | End: 2021-12-01 | Stop reason: HOSPADM

## 2021-11-30 RX ORDER — METHADONE HYDROCHLORIDE 10 MG/ML
90 CONCENTRATE ORAL DAILY
COMMUNITY

## 2021-11-30 RX ORDER — AMOXICILLIN 250 MG
2 CAPSULE ORAL 2 TIMES DAILY PRN
Status: DISCONTINUED | OUTPATIENT
Start: 2021-11-30 | End: 2021-12-01 | Stop reason: HOSPADM

## 2021-11-30 RX ORDER — NALOXONE HYDROCHLORIDE 0.4 MG/ML
0.4 INJECTION, SOLUTION INTRAMUSCULAR; INTRAVENOUS; SUBCUTANEOUS
Status: DISCONTINUED | OUTPATIENT
Start: 2021-11-30 | End: 2021-12-01 | Stop reason: HOSPADM

## 2021-11-30 RX ORDER — METHADONE HYDROCHLORIDE 10 MG/ML
90 CONCENTRATE ORAL DAILY
Status: DISCONTINUED | OUTPATIENT
Start: 2021-11-30 | End: 2021-12-01 | Stop reason: HOSPADM

## 2021-11-30 RX ORDER — POLYETHYLENE GLYCOL 3350 17 G/17G
17 POWDER, FOR SOLUTION ORAL DAILY PRN
Status: DISCONTINUED | OUTPATIENT
Start: 2021-11-30 | End: 2021-12-01 | Stop reason: HOSPADM

## 2021-11-30 RX ORDER — ACETAMINOPHEN 500 MG
1000 TABLET ORAL EVERY 8 HOURS
Status: DISCONTINUED | OUTPATIENT
Start: 2021-11-30 | End: 2021-12-01 | Stop reason: HOSPADM

## 2021-11-30 RX ORDER — PANTOPRAZOLE SODIUM 40 MG/1
40 TABLET, DELAYED RELEASE ORAL
Status: DISCONTINUED | OUTPATIENT
Start: 2021-11-30 | End: 2021-12-01 | Stop reason: HOSPADM

## 2021-11-30 RX ORDER — AMOXICILLIN 250 MG
1 CAPSULE ORAL AT BEDTIME
Status: DISCONTINUED | OUTPATIENT
Start: 2021-11-30 | End: 2021-12-01 | Stop reason: HOSPADM

## 2021-11-30 RX ADMIN — FAMOTIDINE 20 MG: 20 TABLET, FILM COATED ORAL at 08:51

## 2021-11-30 RX ADMIN — ACETAMINOPHEN 650 MG: 325 TABLET, FILM COATED ORAL at 20:35

## 2021-11-30 RX ADMIN — METHADONE HYDROCHLORIDE 90 MG: 10 CONCENTRATE ORAL at 09:28

## 2021-11-30 RX ADMIN — FERROUS SULFATE TAB 325 MG (65 MG ELEMENTAL FE) 325 MG: 325 (65 FE) TAB at 08:51

## 2021-11-30 RX ADMIN — ACETAMINOPHEN 1000 MG: 500 TABLET, FILM COATED ORAL at 16:00

## 2021-11-30 RX ADMIN — ACETAMINOPHEN 650 MG: 325 TABLET, FILM COATED ORAL at 00:09

## 2021-11-30 RX ADMIN — SODIUM CHLORIDE 50 ML: 9 INJECTION, SOLUTION INTRAVENOUS at 16:35

## 2021-11-30 RX ADMIN — FAMOTIDINE 20 MG: 20 TABLET, FILM COATED ORAL at 20:32

## 2021-11-30 RX ADMIN — PANTOPRAZOLE SODIUM 40 MG: 40 TABLET, DELAYED RELEASE ORAL at 09:29

## 2021-11-30 RX ADMIN — REMDESIVIR 100 MG: 100 INJECTION, POWDER, LYOPHILIZED, FOR SOLUTION INTRAVENOUS at 16:00

## 2021-11-30 RX ADMIN — ACETAMINOPHEN 650 MG: 325 TABLET, FILM COATED ORAL at 09:00

## 2021-11-30 RX ADMIN — ONDANSETRON 4 MG: 2 INJECTION INTRAMUSCULAR; INTRAVENOUS at 10:27

## 2021-11-30 RX ADMIN — ENOXAPARIN SODIUM 40 MG: 40 INJECTION SUBCUTANEOUS at 00:16

## 2021-11-30 RX ADMIN — DEXAMETHASONE 6 MG: 2 TABLET ORAL at 12:30

## 2021-11-30 ASSESSMENT — ACTIVITIES OF DAILY LIVING (ADL)
ADLS_ACUITY_SCORE: 3

## 2021-11-30 NOTE — PROGRESS NOTES
Cross Cover    Called for hgb 6.9, yesterday 7.3 and patient refused transfusion, defer to day rounder

## 2021-11-30 NOTE — PROGRESS NOTES
[facility]    Medicine Progress Note - Hospitalist Service       Date of Admission:  11/28/2021    Assessment & Plan           Ms. Srinivasan is a 36-year-old female with a past medical history significant for chronic methadone use.  Not vaccinated for COVID-19.  Began having symptoms 11/22.  Initial positive SARS-CoV-2 PCR on 11/28.  Hospitalized on 11/28.    1.  COVID-19 pneumonia.  With acute hypoxic respite failure.  -Continue dexamethasone 6 mg a day.  -Continue IV remdesivir with plan for 5-day course.  -Subcutaneous enoxaparin for DVT prophylaxis.  -Supplemental oxygen as needed.    2.  Acute hypoxic respiratory failure.  Due to COVID-19 pneumonia.  -Supplemental oxygen as needed.  -Treat COVID-19 as noted above.    3.  Reported chronic methadone use.  -Pain management team consult.  -Resumed methadone after dose verified by pharmacy.    4.  Hypokalemia.  Potassium initially 3.2.  -Potassium replacement protocol.    5.  Iron deficiency anemia.  Hemoglobin 7.3 today.  -Recheck CBC tomorrow.  -Continue ferrous sulfate.  -Patient will need close monitoring outpatient.  -Has heavy menstrual bleeding at this time         Diet: Combination Diet Regular Diet Adult    DVT Prophylaxis: Enoxaparin (Lovenox) SQ  Barnes Catheter: Not present  Central Lines: None  Code Status: Full Code          Manisha Howard MD  Hospitalist Service  [facility]  Securely message with the Vocera Web Console (learn more here)  Text page via [email or web address] Paging/Directory        Clinically Significant Risk Factors Present on Admission                ______________________________________________________________________    Interval History   Assumed care reviewed chart, maintaining oxygen on room air at rest however require supplemental O2 with activity.  Also requiring oxygen at night.  Denies any chest pain pressure heaviness.  However does feel short of breath with activity.  More than ten point  review of systems was carried out was otherwise negative    Data reviewed today: I reviewed all medications, new labs and imaging results over the last 24 hours.     Physical Exam   Vital Signs: Temp: 97.7  F (36.5  C) Temp src: Oral BP: 98/64 Pulse: 62   Resp: 24 SpO2: 93 % O2 Device: Nasal cannula Oxygen Delivery: 1 LPM  Weight: 198 lbs 0 oz  Gen:  NAD, A&Ox3.  Eyes:  PERRL, sclera anicteric.  OP:  MMM, no lesions.  Neck:  Supple.  CV:  Regular, no murmurs.  Lung:  CTA b/l, normal effort.  Ab:  +BS, soft.  Skin:  Warm, dry to touch.  No rash.  Ext:  No pitting edema LE b/l.      Data   Recent Labs   Lab 11/30/21  0642 11/29/21  2053 11/29/21  1328 11/29/21  0645 11/29/21  0046 11/28/21  1649   WBC 6.1  --   --  3.6* 4.7 6.0   HGB 7.5*  --   --  7.3* 7.6* 8.1*   MCV 74*  --   --  74* 74* 73*     --   --  267 262 269     --   --  138 139 140   POTASSIUM 3.9 3.7 3.3* 3.4 3.5 3.2*   CHLORIDE 110*  --   --  105 104 103   CO2 26  --   --  28 32 30   BUN 13  --   --  7 6* 5*   CR 0.64  --   --  0.59 0.63 0.70   ANIONGAP 7  --   --  5 3 7   MILLIE 7.9*  --   --  8.0* 7.6* 8.1*   *  --   --  120* 157* 86   ALBUMIN  --   --   --   --  2.9* 3.2*   PROTTOTAL  --   --   --   --  7.4 8.0   BILITOTAL  --   --   --   --  0.5 0.4   ALKPHOS  --   --   --   --  39* 42   ALT  --   --   --   --  23 25   AST  --   --   --   --  32 33   TROPONIN  --   --   --   --   --  <0.015

## 2021-11-30 NOTE — PROGRESS NOTES
SPIRITUAL HEALTH SERVICES Progress Note  [facility] P[facility]. Unit - Adult Overflow    Attempted to see pt per [facility] consult; staff requesting emotional support for pt.  Coordinated with pt's RN to set up a bedside tablet for a video connection; however, RN reported that pt deferred  support when he attempted to set up the tablet.    Plan: Will follow up tomorrow by calling pt's bedside phone and orienting her to [facility].    Jack Figueredo M.Div., [facility]  Staff   Phone [phone]

## 2021-11-30 NOTE — PROGRESS NOTES
"[facility]     Date:11/29/21  Admission Dx:Covid   Pulmonary History none  Home Nebulizer/MDI Use:none  Home Oxygen:none  Acuity Level (RCAT flow sheet):3 -5  Aerosol Therapy initiated: prn MDI   Pulmonary Hygiene initiated: IS   Volume Expansion initiated:IS   Current Oxygen Requirements:1 lpm  Current SpO2:95  Re-evaluation date:12/3/21  Patient Education: IS education .   MDI prn - no pulmonary history       See \"RT Assessments\" flow sheet for patient assessment scoring and Acuity Level Details.           "

## 2021-11-30 NOTE — PLAN OF CARE
Pt still independent in her room, O2 sats in the mid to upper 90s on RA. Oximetry study ordered for tonight. Methadone resumed, new order for scheduled tylenol as she is complaining of lower back pain related to coughing. Fair appetite, voiding adequate amounts. Will keep monitoring.

## 2021-11-30 NOTE — PLAN OF CARE
"BP 98/64   Pulse 62   Temp 97.7  F (36.5  C) (Oral)   Resp 24   Ht 1.664 m (5' 5.5\")   Wt 89.8 kg (198 lb)   LMP 11/28/2021   SpO2 93%   BMI 32.45 kg/m    Neuro: WDL  Cardiac: WDL  Lungs: Coarse.  Frequent non productive cough.  GI: WDL  : WDL  Pain: 5/10 in back.  IV: Saline locked  Meds: Tylenol for pain  Labs/tests: None overnight.  Diet: None overnight.  Activity: Independent  Misc: N/A  Plan: Continue to monitor respiratory status closely.    Patient is stable and on 2L of nasal cannula.  Independent, on a regular diet. Afebrile.  Will continue to monitor and provide cares.     "

## 2021-11-30 NOTE — CONSULTS
[facility]  Pain Service Consultation   Text Page    Date of Admission:  11/28/2021    Assessment & Plan   Nola Srinivasan is a 36 year old female who was admitted on 11/28/2021. I was asked by Hospitalist Manisha Howard MD to see the patient for chronic pain on methadone.    Met Nola as she was resting in bed. She complains of back pain due to cough. States it is improved with Tylenol. Reasonable to schedule Tylenol for comfort and offer topical Voltaren for low back discomfort.       As patient does not have a chronic pain history our team will sign off. Please consult us again if any questions arise. DO NOT RECOMMEND OPIATES ON DISMISSAL METHADONE FOR CHEMICAL DEPENDENCY WITH FOLLOW UP AT [facility].     PLAN:   1)  Opioids:  Methadone 90 mg Daily per chronic Chemical Dependency plan per [facility]. Will need follow up at [facility] to continue Methadone take out plan. Do not recommend opiates on dismissal.  Opioids Treatment Goal:   -Do not escalate from chronic pain plan    2)  Non-opioid multimodal medication therapy  -Topical: Voltaren 1% Topical Gel QID PRN  -N-SAIDS: Avoid due to GI distress   -Muscle Relaxants:None indicated  -Adjuvants:Acetaminophen 1,000 every 8 hours  -Antidepressants/anxiolytics:    3)  Non-medication interventions  Positioning, Heating pad PRN, Relaxation    consulted     4)  Constipation Prophylaxis  Senna-Docusate at bedtime and prn Polyethylene Glycol daily prn    5)  Medication Risk reduction strategies   - Monitor for sedation   - Capnography per protocol   - Narcan for opioid reversal     6)  Pain Education  -Opioid safe use, storage and disposal information included in DC AVS    7)  DC Planning   Discussed goal of opioid therapy as above with patient, bedside nurse Jose E Draper RN and Hospitalist Manisha Howard MD  Continued outpatient management of pain per [facility] (Chemical  Dependency)  Disposition: Home  Support systems: [facility]  Outpatient Referrals: Return to [facility] chemical dependency program  The following risk factors have been identified for unintentional overdose: patient is taking a high amount of opioids in 24 hour period and patient has a history of substance abuse disorder. Discharge with intra-nasal naloxone if discharged to home with opioids  >40 mg MME/day.  Plan for education prior to discharge.    ASSESSMENT  1)  Acute back pain due to cough. Patient denies history of chronic pain     2)  Patient on chronic opioid therapy for Chemical Dependency managed by [facility]  Baseline 90 mg Methadone  Patient has a high opioid tolerance.     Patient's opioid use in past 24 hours: 90 mg Methadone = Unable to determine Daily Morphine Equivalent due to variable bioavailability of Methadone    3)  Risk factors for opioid related harms  -History of substance abuse disorder    4)  Opioid induced side-effects:  -Constipation - no/denies  -Nausea/Vomit - Yes, was feeling nauseated shortly after Methadone dosing (due to COVID-19 symptoms)   -Sedation- No/denies  -Urinary Retention - No/denies    5)  Other/Related:    -Disease of addiction - continue chronic Chemical Dependency plan for [place]     Time Spent on this Encounter   Total unit/floor time 9:40 AM until 10:40 AM, time consisted of the following, examination of the patient, reviewing the record and completing documentation. >50% of time spent in counseling and coordination of care.  Time spend counseling with patient consisted of the following topics, symptom management.  Time spent in coordination of care with Bedside Nurse Jose E Draper RN and Hospitalist Manisha Howard MD.     Cesilia Sales MS, RN, CNS, APRN, ACHPN, FAACVPR  Pain and Palliative Care  Pager [phone]  Office [phone]     Primary Care Physician   Primary Care Physician:Physician No Ref-Primary  Pain Specialist: None - patient on  "Methadone 90 mg daily for Chemical Dependency    Chief Complaint   Flu Symptoms    History is obtained from the patient and electronic health record    History of Present Illness   Nola Srinivasan is a 36 year old female who was unvaccinated for COVID-19 admitted 2021 with acute hypoxic respiratory failure due to COVID-19 pneumonia. She was exposed to COVID-19 on 2021 after her mother  from COVID-19 pneumonia.     CURRENT PAIN:  Her pain is located in the mid to lower back  It is described as Nagging and Throbbing  She rates it as ranging between 0/10 and 6/10  The average is 4/10 on a scale of 0-10  Currently it is rated as 3/10  It improves by \"Tylenol has really helped\"  It worsens by \"coughing\"  She has been compliant with the recommendations while in the hospital.      PAST PAIN TREATMENT:   Medications: Methadone for Chemical Dependency  Non-pharmacologic modalities:  Previous interventions/surgeries: None    [place] Board of Pharmacy Data Base Reviewed:    YES; As expected, no concern for misuse/abuse of controlled medications based on this report.  OPIOID RISK SCORE= 000 as Methadone from [place] Dependency is on [place]     Past Medical History   I have reviewed this patient's medical history and updated it with pertinent information if needed.   No past medical history on file.    Past Surgical History   I have reviewed this patient's surgical history and updated it with pertinent information if needed.  No past surgical history on file.      Prior to Admission Medications   Prior to Admission Medications   Prescriptions Last Dose Informant Patient Reported? Taking?   Pseudoephedrine-DM-GG-APAP (DAYQUIL LIQUICAPS OR) prn at prn Self Yes Yes   Sig: Take 2 capsules by mouth every 4 hours as needed   acetaminophen (TYLENOL) 325 MG tablet prn at prn Self Yes Yes   Sig: Take 325-650 mg by mouth every 6 hours as needed for mild pain   ibuprofen (ADVIL/MOTRIN) 200 MG tablet prn at prn Self Yes Yes "   Sig: Take 200 mg by mouth every 4 hours as needed for mild pain   methadone (DOLOPHINE-INTENSOL) 10 MG/ML (HIGH CONC) solution 11/28/2021  Yes Yes   Sig: Take 90 mg by mouth daily      Facility-Administered Medications: None     Allergies   Allergies   Allergen Reactions     Penicillin G GI Disturbance       Social History   I have reviewed this patient's social history and updated it with pertinent information if needed. Nola Srinivasan      Family History   I have reviewed this patient's family history and updated it with pertinent information if needed.     No family history on file.  Family history of addiction YES    Review of Systems   The 10 point Review of Systems is negative other than noted in the HPI or here.    Denies Bowel or bladder dysfunction    Physical Exam   Temp:  [97.5  F (36.4  C)-97.8  F (36.6  C)] 97.6  F (36.4  C)  Pulse:  [57-72] 57  Resp:  [18-24] 18  BP: ()/(62-73) 109/73  SpO2:  [91 %-97 %] 97 %  198 lbs 0 oz  GEN:  Polite young black female. Alert, oriented x 3, appears comfortable, no apparent distress.  HEENT:  Normocephalic/atraumatic, no scleral icterus, no nasal discharge, mouth moist.  CV:  RRR, S1, S2; no murmurs or other irregularities noted.  +3 DP/PT pulses bilaterally; no edema bilateral lower extremities.  RESP:  Clear to auscultation bilaterally without rales/rhonchi/wheezing/retractions.  Symmetric chest rise on inhalation noted.  Normal respiratory effort.  ABD:  Rounded, soft, non-tender/non-distended.  +BS  EXT:  Color, moisture and sensation intact x 4.     M/S:   Denies discomfort with palpation of extremities and spine.    SKIN:  Warm and dry to touch, no exanthems noted in the visualized areas.    NEURO: Symmetric strength +5/5.  Sensation to touch intact all extremities.   There is no area of allodynia or hyperesthesia.  PAIN BEHAVIOR: Cooperative  Psych:  Normal affect.  Calm, cooperative, conversant appropriately.       Data   Results for orders placed or  performed during the hospital encounter of 11/28/21   CT Chest Pulmonary Embolism w Contrast     Status: None    Narrative    EXAM: CT CHEST PULMONARY EMBOLISM W CONTRAST  LOCATION: [facility]  DATE/TIME: 11/28/2021 6:59 PM    INDICATION: PE suspected, low/intermediate prob, positive D-dimer, low O2 saturation, fever, shortness of breath, cough, myalgia, back pain  COMPARISON: None.  TECHNIQUE: CT chest pulmonary angiogram during arterial phase injection of IV contrast. Multiplanar reformats and MIP reconstructions were performed. Dose reduction techniques were used.   CONTRAST: 80mL Isovue-370    FINDINGS:  ANGIOGRAM CHEST: Pulmonary arteries are normal caliber and negative for pulmonary emboli. Thoracic aorta is negative for dissection. No CT evidence of right heart strain.    LUNGS AND PLEURA: Diffuse moderate to extensive areas of groundglass opacity and developing consolidation throughout both hemithoraces most prominent towards the lower lung fields. No effusion.    MEDIASTINUM/AXILLAE: Normal.    CORONARY ARTERY CALCIFICATION: None.    UPPER ABDOMEN: Normal.    MUSCULOSKELETAL: Normal.      Impression    IMPRESSION:  1.  Diffuse moderate to extensive areas of groundglass opacity and developing consolidation throughout both hemithoraces most prominent towards the lower lung fields most consistent with COVID pneumonia. Follow-up in 3 months recommended to ensure   resolution and exclude other pathology.  2.  No pulmonary embolus aortic aneurysm or dissection.    Commonly reported imaging features of (COVID-19) pneumonia are present. Influenza pneumonia and organizing pneumonia as can be seen in the setting of drug toxicity and connective tissue disease can cause a similar imaging pattern.   Basic metabolic panel     Status: Abnormal   Result Value Ref Range    Sodium 140 133 - 144 mmol/L    Potassium 3.2 (L) 3.4 - 5.3 mmol/L    Chloride 103 94 - 109 mmol/L    Carbon Dioxide (CO2) 30 20 -  32 mmol/L    Anion Gap 7 3 - 14 mmol/L    Urea Nitrogen 5 (L) 7 - 30 mg/dL    Creatinine 0.70 0.52 - 1.04 mg/dL    Calcium 8.1 (L) 8.5 - 10.1 mg/dL    Glucose 86 70 - 99 mg/dL    GFR Estimate >90 >60 mL/min/1.73m2   D dimer quantitative     Status: Abnormal   Result Value Ref Range    D-Dimer Quantitative 1.38 (H) 0.00 - 0.50 ug/mL FEU    Narrative    This D-dimer assay is intended for use in conjunction with a clinical pretest probability assessment model to exclude pulmonary embolism (PE) and deep venous thrombosis (DVT) in outpatients suspected of PE or DVT. The cut-off value is 0.50 ug/mL FEU.   UA with Microscopic reflex to Culture     Status: Abnormal    Specimen: Urine, Midstream   Result Value Ref Range    Color Urine Light Yellow Colorless, Straw, Light Yellow, Yellow    Appearance Urine Clear Clear    Glucose Urine Negative Negative mg/dL    Bilirubin Urine Negative Negative    Ketones Urine Negative Negative mg/dL    Specific Gravity Urine 1.010 1.003 - 1.035    Blood Urine Trace (A) Negative    pH Urine 7.0 5.0 - 7.0    Protein Albumin Urine 30  (A) Negative mg/dL    Urobilinogen Urine Normal Normal, 2.0 mg/dL    Nitrite Urine Negative Negative    Leukocyte Esterase Urine Negative Negative    RBC Urine <1 <=2 /HPF    WBC Urine 1 <=5 /HPF    Squamous Epithelials Urine 1 <=1 /HPF    Narrative    Urine Culture not indicated   HCG qualitative urine     Status: Normal   Result Value Ref Range    hCG Urine Qualitative Negative Negative   Symptomatic Influenza A/B & SARS-CoV2 (COVID-19) Virus PCR Multiplex Nasopharyngeal     Status: Abnormal    Specimen: Nasopharyngeal; Swab   Result Value Ref Range    Influenza A PCR Negative Negative    Influenza B PCR Negative Negative    SARS CoV2 PCR Positive (A) Negative    Narrative    Testing was performed using the jami SARS-CoV-2 & Influenza A/B Assay on the jami Usha System. This test should be ordered for the detection of SARS-CoV-2 and influenza viruses in  individuals who meet clinical and/or epidemiological criteria. Test performance is unknown in asymptomatic patients. This test is for in vitro diagnostic use under the FDA EUA for laboratories certified under CLIA to perform moderate and/or high complexity testing. This test has not been FDA cleared or approved. A negative result does not rule out the presence of PCR inhibitors in the specimen or target RNA in concentration below the limit of detection for the assay. If only one viral target is positive but coinfection with multiple targets is suspected, the sample should be re-tested with another FDA cleared, approved or authorized test, if coinfection would change clinical management. [facility] are certified under the Clinical Laboratory Improvement Amendments of 1988 (CLIA-88) as  qualified to perform moderate and/or high complexity laboratory testing.   CBC with platelets and differential     Status: Abnormal   Result Value Ref Range    WBC Count 6.0 4.0 - 11.0 10e3/uL    RBC Count 3.86 3.80 - 5.20 10e6/uL    Hemoglobin 8.1 (L) 11.7 - 15.7 g/dL    Hematocrit 28.1 (L) 35.0 - 47.0 %    MCV 73 (L) 78 - 100 fL    MCH 21.0 (L) 26.5 - 33.0 pg    MCHC 28.8 (L) 31.5 - 36.5 g/dL    RDW 18.2 (H) 10.0 - 15.0 %    Platelet Count 269 150 - 450 10e3/uL    % Neutrophils 71 %    % Lymphocytes 23 %    % Monocytes 5 %    % Eosinophils 0 %    % Basophils 0 %    % Immature Granulocytes 1 %    NRBCs per 100 WBC 0 <1 /100    Absolute Neutrophils 4.3 1.6 - 8.3 10e3/uL    Absolute Lymphocytes 1.4 0.8 - 5.3 10e3/uL    Absolute Monocytes 0.3 0.0 - 1.3 10e3/uL    Absolute Eosinophils 0.0 0.0 - 0.7 10e3/uL    Absolute Basophils 0.0 0.0 - 0.2 10e3/uL    Absolute Immature Granulocytes 0.0 <=0.0 10e3/uL    Absolute NRBCs 0.0 10e3/uL   Iron and iron binding capacity     Status: Abnormal   Result Value Ref Range    Iron 12 (L) 35 - 180 ug/dL    Iron Binding Capacity 332 240 - 430 ug/dL    Iron Sat Index 4 (L) 15 - 46 %    Hepatic panel     Status: Abnormal   Result Value Ref Range    Bilirubin Total 0.4 0.2 - 1.3 mg/dL    Bilirubin Direct 0.2 0.0 - 0.2 mg/dL    Protein Total 8.0 6.8 - 8.8 g/dL    Albumin 3.2 (L) 3.4 - 5.0 g/dL    Alkaline Phosphatase 42 40 - 150 U/L    AST 33 0 - 45 U/L    ALT 25 0 - 50 U/L   Comprehensive metabolic panel     Status: Abnormal   Result Value Ref Range    Sodium 139 133 - 144 mmol/L    Potassium 3.5 3.4 - 5.3 mmol/L    Chloride 104 94 - 109 mmol/L    Carbon Dioxide (CO2) 32 20 - 32 mmol/L    Anion Gap 3 3 - 14 mmol/L    Urea Nitrogen 6 (L) 7 - 30 mg/dL    Creatinine 0.63 0.52 - 1.04 mg/dL    Calcium 7.6 (L) 8.5 - 10.1 mg/dL    Glucose 157 (H) 70 - 99 mg/dL    Alkaline Phosphatase 39 (L) 40 - 150 U/L    AST 32 0 - 45 U/L    ALT 23 0 - 50 U/L    Protein Total 7.4 6.8 - 8.8 g/dL    Albumin 2.9 (L) 3.4 - 5.0 g/dL    Bilirubin Total 0.5 0.2 - 1.3 mg/dL    GFR Estimate >90 >60 mL/min/1.73m2   Troponin I     Status: Normal   Result Value Ref Range    Troponin I High Sensitivity 8 <54 ng/L   CRP inflammation     Status: Abnormal   Result Value Ref Range    CRP Inflammation 95.7 (H) 0.0 - 8.0 mg/L   Troponin I     Status: Normal   Result Value Ref Range    Troponin I <0.015 0.000 - 0.045 ug/L   CBC with platelets and differential     Status: Abnormal   Result Value Ref Range    WBC Count 4.7 4.0 - 11.0 10e3/uL    RBC Count 3.70 (L) 3.80 - 5.20 10e6/uL    Hemoglobin 7.6 (L) 11.7 - 15.7 g/dL    Hematocrit 27.4 (L) 35.0 - 47.0 %    MCV 74 (L) 78 - 100 fL    MCH 20.5 (L) 26.5 - 33.0 pg    MCHC 27.7 (L) 31.5 - 36.5 g/dL    RDW 18.2 (H) 10.0 - 15.0 %    Platelet Count 262 150 - 450 10e3/uL   Manual Differential     Status: Abnormal   Result Value Ref Range    % Neutrophils 86 %    % Lymphocytes 12 %    % Monocytes 2 %    % Eosinophils 0 %    % Basophils 0 %    Absolute Neutrophils 4.0 1.6 - 8.3 10e3/uL    Absolute Lymphocytes 0.6 (L) 0.8 - 5.3 10e3/uL    Absolute Monocytes 0.1 0.0 - 1.3 10e3/uL    Absolute  Eosinophils 0.0 0.0 - 0.7 10e3/uL    Absolute Basophils 0.0 0.0 - 0.2 10e3/uL    RBC Morphology Confirmed RBC Indices     Platelet Assessment  Automated Count Confirmed. Platelet morphology is normal.     Automated Count Confirmed. Platelet morphology is normal.   CBC with platelets     Status: Abnormal   Result Value Ref Range    WBC Count 3.6 (L) 4.0 - 11.0 10e3/uL    RBC Count 3.55 (L) 3.80 - 5.20 10e6/uL    Hemoglobin 7.3 (L) 11.7 - 15.7 g/dL    Hematocrit 26.3 (L) 35.0 - 47.0 %    MCV 74 (L) 78 - 100 fL    MCH 20.6 (L) 26.5 - 33.0 pg    MCHC 27.8 (L) 31.5 - 36.5 g/dL    RDW 18.3 (H) 10.0 - 15.0 %    Platelet Count 267 150 - 450 10e3/uL   Basic metabolic panel     Status: Abnormal   Result Value Ref Range    Sodium 138 133 - 144 mmol/L    Potassium 3.4 3.4 - 5.3 mmol/L    Chloride 105 94 - 109 mmol/L    Carbon Dioxide (CO2) 28 20 - 32 mmol/L    Anion Gap 5 3 - 14 mmol/L    Urea Nitrogen 7 7 - 30 mg/dL    Creatinine 0.59 0.52 - 1.04 mg/dL    Calcium 8.0 (L) 8.5 - 10.1 mg/dL    Glucose 120 (H) 70 - 99 mg/dL    GFR Estimate >90 >60 mL/min/1.73m2   CRP inflammation     Status: Abnormal   Result Value Ref Range    CRP Inflammation 81.8 (H) 0.0 - 8.0 mg/L   D dimer quantitative     Status: Abnormal   Result Value Ref Range    D-Dimer Quantitative 1.13 (H) 0.00 - 0.50 ug/mL FEU    Narrative    This D-dimer assay is intended for use in conjunction with a clinical pretest probability assessment model to exclude pulmonary embolism (PE) and deep venous thrombosis (DVT) in outpatients suspected of PE or DVT. The cut-off value is 0.50 ug/mL FEU.   Potassium     Status: Abnormal   Result Value Ref Range    Potassium 3.3 (L) 3.4 - 5.3 mmol/L   Potassium     Status: Normal   Result Value Ref Range    Potassium 3.7 3.4 - 5.3 mmol/L   CBC with platelets     Status: Abnormal   Result Value Ref Range    WBC Count 6.1 4.0 - 11.0 10e3/uL    RBC Count 3.58 (L) 3.80 - 5.20 10e6/uL    Hemoglobin 7.5 (L) 11.7 - 15.7 g/dL    Hematocrit  26.4 (L) 35.0 - 47.0 %    MCV 74 (L) 78 - 100 fL    MCH 20.9 (L) 26.5 - 33.0 pg    MCHC 28.4 (L) 31.5 - 36.5 g/dL    RDW 18.4 (H) 10.0 - 15.0 %    Platelet Count 284 150 - 450 10e3/uL   Basic metabolic panel     Status: Abnormal   Result Value Ref Range    Sodium 143 133 - 144 mmol/L    Potassium 3.9 3.4 - 5.3 mmol/L    Chloride 110 (H) 94 - 109 mmol/L    Carbon Dioxide (CO2) 26 20 - 32 mmol/L    Anion Gap 7 3 - 14 mmol/L    Urea Nitrogen 13 7 - 30 mg/dL    Creatinine 0.64 0.52 - 1.04 mg/dL    Calcium 7.9 (L) 8.5 - 10.1 mg/dL    Glucose 113 (H) 70 - 99 mg/dL    GFR Estimate >90 >60 mL/min/1.73m2   CRP inflammation     Status: Abnormal   Result Value Ref Range    CRP Inflammation 31.7 (H) 0.0 - 8.0 mg/L   D dimer quantitative     Status: Abnormal   Result Value Ref Range    D-Dimer Quantitative 0.64 (H) 0.00 - 0.50 ug/mL FEU    Narrative    This D-dimer assay is intended for use in conjunction with a clinical pretest probability assessment model to exclude pulmonary embolism (PE) and deep venous thrombosis (DVT) in outpatients suspected of PE or DVT. The cut-off value is 0.50 ug/mL FEU.   ABO and Rh     Status: None   Result Value Ref Range    ABO/RH(D) A POS     SPECIMEN EXPIRATION DATE [id number]    ABO and Rh     Status: None   Result Value Ref Range    ABO/RH(D) A POS     SPECIMEN EXPIRATION DATE [id number]    CBC with platelets differential     Status: Abnormal    Narrative    The following orders were created for panel order CBC with platelets differential.  Procedure                               Abnormality         Status                     ---------                               -----------         ------                     CBC with platelets and d...[[id number]]  Abnormal            Final result                 Please view results for these tests on the individual orders.   CBC with Platelets & Differential     Status: Abnormal    Narrative    The following orders were created for panel order CBC  with Platelets & Differential.  Procedure                               Abnormality         Status                     ---------                               -----------         ------                     CBC with platelets and d...[[id number]]  Abnormal            Final result               Manual Differential[[id number]]          Abnormal            Final result                 Please view results for these tests on the individual orders.

## 2021-11-30 NOTE — PHARMACY-PHARMACOTHERAPY NOTE
"Methadone Clinic Information Note    Clinic Name: [facility] Location ([facility]): [place]  Phone Number: [phone]  Prescriber's Name: Spoke with RN Rachel, who verified dose as 90 mg daily. Patient last dosed in clinic on 11-27 and had a \"take out\" dose for 11-28.        "

## 2021-11-30 NOTE — PLAN OF CARE
Afebrile, VSS, RR 20-22. Pt does have some SOB with exertion but overall states this is improving. O2 sats 92-94% on 1L per NC. Lungs with crackles t/o, coughs after taking deep breaths and after using inspirometer. Continued on Remdesivir this evening and lovenox subcutaneous.Taking tyl for mild discomfort of lower back. Up to BR independently. Potassium now at 3.7 after po replacement. IV saline locked. Pt concerned she has missed a methadone dose today and was feeling sweaty. Dr. Calix had been notified at 1600 and pharmacist also involved. Methadone unable to be verified this evening and will be followed up with early am. Pt aware of plan of care.

## 2021-11-30 NOTE — PLAN OF CARE
DX: Covid Pos  Tele: na  A&O x4  Activity: Ind  Diet: Reg  VSS: Q4, pt HR has been low 50's  O2: RA 95%  BG: na  PIV: SL RA  Pain: lower back  GI/: continent  Labs: Hgb 7.5, K 3.9  Plan: overnight oximetry study tonight  Discharge: TBD continue plan of care   [id number]

## 2021-12-01 VITALS
OXYGEN SATURATION: 95 % | BODY MASS INDEX: 31.82 KG/M2 | WEIGHT: 198 LBS | HEART RATE: 52 BPM | SYSTOLIC BLOOD PRESSURE: 94 MMHG | HEIGHT: 66 IN | DIASTOLIC BLOOD PRESSURE: 67 MMHG | RESPIRATION RATE: 18 BRPM | TEMPERATURE: 97.6 F

## 2021-12-01 LAB
ALBUMIN SERPL-MCNC: 2.8 G/DL (ref 3.4–5)
ALP SERPL-CCNC: 41 U/L (ref 40–150)
ALT SERPL W P-5'-P-CCNC: 28 U/L (ref 0–50)
ANION GAP SERPL CALCULATED.3IONS-SCNC: 5 MMOL/L (ref 3–14)
AST SERPL W P-5'-P-CCNC: 20 U/L (ref 0–45)
BASOPHILS # BLD AUTO: 0 10E3/UL (ref 0–0.2)
BASOPHILS NFR BLD AUTO: 0 %
BILIRUB DIRECT SERPL-MCNC: 0.2 MG/DL (ref 0–0.2)
BILIRUB SERPL-MCNC: 0.5 MG/DL (ref 0.2–1.3)
BUN SERPL-MCNC: 16 MG/DL (ref 7–30)
CALCIUM SERPL-MCNC: 7.9 MG/DL (ref 8.5–10.1)
CHLORIDE BLD-SCNC: 111 MMOL/L (ref 94–109)
CO2 SERPL-SCNC: 25 MMOL/L (ref 20–32)
CREAT SERPL-MCNC: 0.63 MG/DL (ref 0.52–1.04)
CRP SERPL-MCNC: 11.6 MG/L (ref 0–8)
D DIMER PPP FEU-MCNC: 0.67 UG/ML FEU (ref 0–0.5)
ELLIPTOCYTES BLD QL SMEAR: SLIGHT
EOSINOPHIL # BLD AUTO: 0 10E3/UL (ref 0–0.7)
EOSINOPHIL NFR BLD AUTO: 0 %
ERYTHROCYTE [DISTWIDTH] IN BLOOD BY AUTOMATED COUNT: 18.9 % (ref 10–15)
GFR SERPL CREATININE-BSD FRML MDRD: >90 ML/MIN/1.73M2
GLUCOSE BLD-MCNC: 111 MG/DL (ref 70–99)
HCT VFR BLD AUTO: 27.3 % (ref 35–47)
HGB BLD-MCNC: 7.8 G/DL (ref 11.7–15.7)
IMM GRANULOCYTES # BLD: 0.1 10E3/UL
IMM GRANULOCYTES NFR BLD: 1 %
LYMPHOCYTES # BLD AUTO: 1.6 10E3/UL (ref 0.8–5.3)
LYMPHOCYTES NFR BLD AUTO: 19 %
MCH RBC QN AUTO: 20.7 PG (ref 26.5–33)
MCHC RBC AUTO-ENTMCNC: 28.6 G/DL (ref 31.5–36.5)
MCV RBC AUTO: 73 FL (ref 78–100)
MONOCYTES # BLD AUTO: 0.5 10E3/UL (ref 0–1.3)
MONOCYTES NFR BLD AUTO: 6 %
NEUTROPHILS # BLD AUTO: 5.9 10E3/UL (ref 1.6–8.3)
NEUTROPHILS NFR BLD AUTO: 74 %
NRBC # BLD AUTO: 0 10E3/UL
NRBC BLD AUTO-RTO: 0 /100
PLAT MORPH BLD: ABNORMAL
PLATELET # BLD AUTO: 363 10E3/UL (ref 150–450)
POTASSIUM BLD-SCNC: 3.8 MMOL/L (ref 3.4–5.3)
PROT SERPL-MCNC: 7.3 G/DL (ref 6.8–8.8)
RBC # BLD AUTO: 3.76 10E6/UL (ref 3.8–5.2)
RBC MORPH BLD: ABNORMAL
SODIUM SERPL-SCNC: 141 MMOL/L (ref 133–144)
VARIANT LYMPHS BLD QL SMEAR: PRESENT
WBC # BLD AUTO: 8 10E3/UL (ref 4–11)

## 2021-12-01 PROCEDURE — 36415 COLL VENOUS BLD VENIPUNCTURE: CPT | Performed by: STUDENT IN AN ORGANIZED HEALTH CARE EDUCATION/TRAINING PROGRAM

## 2021-12-01 PROCEDURE — 250N000013 HC RX MED GY IP 250 OP 250 PS 637: Performed by: CLINICAL NURSE SPECIALIST

## 2021-12-01 PROCEDURE — 95801 SLP STDY UNATND W/ANAL: CPT

## 2021-12-01 PROCEDURE — 85025 COMPLETE CBC W/AUTO DIFF WBC: CPT | Performed by: INTERNAL MEDICINE

## 2021-12-01 PROCEDURE — 999N000105 HC STATISTIC NO DOCUMENTATION TO SUPPORT CHARGE

## 2021-12-01 PROCEDURE — 99239 HOSP IP/OBS DSCHRG MGMT >30: CPT | Performed by: INTERNAL MEDICINE

## 2021-12-01 PROCEDURE — 250N000013 HC RX MED GY IP 250 OP 250 PS 637: Performed by: INTERNAL MEDICINE

## 2021-12-01 PROCEDURE — 85379 FIBRIN DEGRADATION QUANT: CPT | Performed by: STUDENT IN AN ORGANIZED HEALTH CARE EDUCATION/TRAINING PROGRAM

## 2021-12-01 PROCEDURE — 250N000012 HC RX MED GY IP 250 OP 636 PS 637: Performed by: STUDENT IN AN ORGANIZED HEALTH CARE EDUCATION/TRAINING PROGRAM

## 2021-12-01 PROCEDURE — 82248 BILIRUBIN DIRECT: CPT | Performed by: INTERNAL MEDICINE

## 2021-12-01 PROCEDURE — 250N000011 HC RX IP 250 OP 636: Performed by: STUDENT IN AN ORGANIZED HEALTH CARE EDUCATION/TRAINING PROGRAM

## 2021-12-01 PROCEDURE — 86140 C-REACTIVE PROTEIN: CPT | Performed by: STUDENT IN AN ORGANIZED HEALTH CARE EDUCATION/TRAINING PROGRAM

## 2021-12-01 PROCEDURE — 250N000013 HC RX MED GY IP 250 OP 250 PS 637: Performed by: STUDENT IN AN ORGANIZED HEALTH CARE EDUCATION/TRAINING PROGRAM

## 2021-12-01 PROCEDURE — 82310 ASSAY OF CALCIUM: CPT | Performed by: STUDENT IN AN ORGANIZED HEALTH CARE EDUCATION/TRAINING PROGRAM

## 2021-12-01 PROCEDURE — 999N000157 HC STATISTIC RCP TIME EA 10 MIN

## 2021-12-01 RX ORDER — IBUPROFEN 200 MG
200 TABLET ORAL EVERY 4 HOURS PRN
Start: 2021-12-01

## 2021-12-01 RX ORDER — DEXAMETHASONE 6 MG/1
6 TABLET ORAL DAILY
Qty: 2 TABLET | Refills: 0 | Status: SHIPPED | OUTPATIENT
Start: 2021-12-02 | End: 2021-12-04

## 2021-12-01 RX ORDER — FERROUS SULFATE 325(65) MG
325 TABLET ORAL DAILY
Qty: 30 TABLET | Refills: 0 | Status: SHIPPED | OUTPATIENT
Start: 2021-12-02 | End: 2022-01-01

## 2021-12-01 RX ADMIN — FAMOTIDINE 20 MG: 20 TABLET, FILM COATED ORAL at 07:48

## 2021-12-01 RX ADMIN — ENOXAPARIN SODIUM 40 MG: 40 INJECTION SUBCUTANEOUS at 00:11

## 2021-12-01 RX ADMIN — FERROUS SULFATE TAB 325 MG (65 MG ELEMENTAL FE) 325 MG: 325 (65 FE) TAB at 07:49

## 2021-12-01 RX ADMIN — PANTOPRAZOLE SODIUM 40 MG: 40 TABLET, DELAYED RELEASE ORAL at 07:48

## 2021-12-01 RX ADMIN — METHADONE HYDROCHLORIDE 90 MG: 10 CONCENTRATE ORAL at 09:03

## 2021-12-01 RX ADMIN — ACETAMINOPHEN 1000 MG: 500 TABLET, FILM COATED ORAL at 07:48

## 2021-12-01 RX ADMIN — DEXAMETHASONE 6 MG: 2 TABLET ORAL at 12:28

## 2021-12-01 ASSESSMENT — ACTIVITIES OF DAILY LIVING (ADL)
ADLS_ACUITY_SCORE: 3

## 2021-12-01 NOTE — PROGRESS NOTES
Cross Cover    Called for HR dropped to 37 then back into upper 40's.  Patient asx, sats 91-97% on RA   Here with hypoxic respiratory failure from covid pna  Not vaccinated    Likely SE from remdesivir  Cont to monitor

## 2021-12-01 NOTE — PLAN OF CARE
VSS. Remained on RA all night, sats mid to high 90s. HR decreased to as low was 37, MD notified and the plan is to continue to monitor. On average HR is mid 40s when resting. Oximeter testing done overnight by RT. Up ad darwin independently in room. Declined pain medication all night. Tolerating regular diet.    Will continue to monitor.  Dipika Sevilla RN

## 2021-12-01 NOTE — PLAN OF CARE
Vital Signs: VSS on RA  Pain/Comfort: patient reported period cramping and low back pain this AM (4/10), well controlled with tylenol  Assessment: Cardiac and GI systems WDL. Lung sounds clear, diminished at bases. Frequent dry cough. Splinting and incentive spirometer use reinforced.  Diet: regular, tolerating well  Output: voids independently  Activity/Ambulation: independent in room  Plan: plan to discharge today, discharge instructions discussed and reviewed, questions answered

## 2021-12-01 NOTE — PROGRESS NOTES
HR decreased to 37 briefly, then back to mid 40s where it has been while she's sleeping. Paged on call MD with JEAN CLAUDE, waiting for a call back. O2 90s on RA.    Dipika Sevilla RN

## 2021-12-01 NOTE — DISCHARGE INSTRUCTIONS
"    Coronavirus Disease 2019 (COVID-19): Caring for Yourself or Others  If you or a household member have symptoms of COVID-19, follow these guidelines for preventing spread of the virus and managing symptoms. This is regardless of your vaccination status.  If you have COVID-19 symptoms    Stay home. Call your healthcare provider and tell them you have symptoms of COVID-19. Do this before going to any hospital or clinic. Follow your provider's instructions. You may be advised to isolate yourself at home. This is called self-isolation. You may also be told to stay at least 6 feet from others to prevent the spread of COVID-19. This is called \"social distancing.\"    Stay away from work, school, and public places. Limit physical contact with family members. Limit visitors. Don't kiss anyone or share eating or drinking utensils. Clean surfaces you touch with disinfectant. This is to help prevent the virus from spreading.    If you need to cough or sneeze, do it into a tissue. Then throw the tissue into the trash. If you don't have tissues, cough or sneeze into the bend of your elbow.    Wear a cloth face mask with two or more layers of washable, breathable fabric and a nose wire while in public or when indoors with people who don't live with you. Or you can wear a disposable paper mask with a cloth mask on top. Wear the mask so that it covers both your nose and mouth.    Don t share food or personal items with people in your household. This includes items like eating and drinking utensils, towels, and bedding.    If you need to go to a hospital or clinic, expect that the healthcare staff will wear protective equipment such as masks, gowns, gloves, and eye protection. You may be advised to wait in or enter through a separate area. This is to prevent the possible virus from spreading.    Tell the healthcare staff about recent travel. This includes local travel on public transport. Staff may need to find other people you " have been in contact with.    Follow all instructions the healthcare staff give you.    If you have been diagnosed with COVID-19    Stay home and start self-isolation. Don t leave your home unless you need to get medical care. Don't go to work, school, or public areas. Don't use public transportation or taxis.    Follow all instructions from your healthcare provider. Call your healthcare provider s office before going. They can prepare and give you instructions. This will help prevent the virus from spreading.    If you need to go to a hospital or clinic, expect that the healthcare staff will wear protective equipment such as masks, gowns, gloves, and eye protection. You may be advised to wait in or enter through a separate area. This is to prevent the possible virus from spreading.    Wear a face mask with 2 or more layers and a nose wire. Use either a cloth mask with layers of tightly woven, breathable fabric or a disposable paper mask with a cloth mask on top. This is to protect other people from your germs. If you are not able to wear a mask, your caregivers should. Wear the mask so that it covers both your nose and mouth.    Stay away from other people in your home.    Stay away from pets and other animals.    Don t share food or personal items with people in your household. This includes items like eating and drinking utensils, towels, and bedding.    If you need to cough or sneeze, do it into a tissue. Then throw the tissue into the trash. If you don't have tissues, cough or sneeze into the bend of your elbow.    Wash your hands often.    Self-care at home   Prevention  The FDA has approved several vaccines to prevent COVID-19 or reduce its severity. These vaccines also reduce how severe the illness will be if you get the virus. No vaccine is ever 100% effective in preventing any illness, but the COVID-19 vaccines work well and are safe. One vaccine has been approved for people as young as 12. Expert groups,  including ACOG and CDC, advise pregnant or breastfeeding people to be vaccinated. Talk with your healthcare provider about which vaccine is best for you and your family.  Treatment  Current treatment is mainly aimed at helping your body while it fights the virus. This is known as supportive care. For serious COVID-19, you may need to stay in the hospital. Supportive care includes:    Getting rest. This helps your body fight the illness.    Staying hydrated.  Drinking liquids is the best way to prevent dehydration. Try to drink 6 to 8 glasses of liquids every day, or as advised by your provider. Also check with your provider about which fluids are best for you. Don't drink fluids that contain caffeine or alcohol.    Taking over-the-counter (OTC) pain medicine. These are used to help ease pain and reduce fever. Follow your healthcare provider's instructions for which OTC medicine to use.  If you've been treated for suspected or confirmed COVID-19 , follow all of your healthcare team's instructions. This will include when it's OK to stop self-isolation. You may also get instructions on position changes to help your breathing, such as lying on your belly (prone positioning). If you were treated at a hospital and discharged, you may be sent home with a pulse oximeter. This is a small electronic device that you clip on your fingertip. It measures the amount of oxygen in your body. Follow your healthcare team's instructions on its use, how they will be in touch with you, and when to call them.  The FDA approved monoclonal antibody therapy for emergency investigational use in certain people who have a positive COVID-19 viral test and have mild to moderate symptoms but are not in the hospital. Your healthcare provider will advise you on whether monoclonal antibody therapy is appropriate for you. It's not approved to prevent COVID-19. It's approved for people 12 years and older who weigh about 88 pounds (40 kgs) and are at  high risk for severe COVID-19 and a hospital stay. This includes people who are 65 years and older and people with certain chronic conditions. Monoclonal antibody therapy is not approved for people who:    Are in the hospital with COVID-19, or    Need oxygen therapy for COVID-19, or    Need oxygen therapy for a chronic condition and need to have oxygen flow increased because of COVID-19     If you've had confirmed COVID-19, your healthcare team may ask you to consider donating your plasma. This is called COVID-19 convalescent plasma donation. Plasma from people fully recovered from COVID-19 may contain antibodies to help fight COVID-19 in people who are currently seriously ill with the disease. Experts don't know the safety of COVID-19 convalescent plasma or how well it works. Research continues. The FDA has approved it for emergency use in certain people with serious or life-threatening COVID-19.  Home care for a sick person     Follow all instructions from healthcare staff.    Wash your hands often.    Wear protective clothing as advised.    Make sure the sick person wears a mask. If they can't wear a mask, don't stay in the same room with the person. If you must be in the same room, wear a face mask. When wearing a mask, make sure that it covers both the nose and mouth.    Keep track of the sick person s symptoms.    Clean home surfaces often with disinfectant. This includes phones, kitchen counters, fridge door handle, bathroom surfaces, and others.    Don t let anyone share household items with the sick person. This includes eating and drinking tools, towels, sheets, or blankets.    Clean fabrics and laundry thoroughly.    Keep other people and pets away from the sick person.    When you can stop self-isolation  When you are sick with COVID-19, you should stay away from other people. This is called self-isolation.  For normally healthy children or adults with COVID-19 symptoms, CDC advises stopping  self-isolation when all 3 of these are true:  1. You have had no fever for at least 24 hours. This means no fever without medicine that reduces fever, such as acetaminophen, for at least 24 hours.  2. Your symptoms such as cough or trouble breathing have improved.  3. It has been at least 10 days since your first symptoms started.  For people who have COVID-19 but no symptoms , isolation can stop 10 days after the first positive test.  If you have a weak immune system and COVID-19, or if you've had severe COVID-19,  your instructions on when to stop isolation will be somewhat different. Some conditions and treatments can cause a weak immune system. These include cancer treatment, bone marrow or organ transplants, and conditions such as HIV or other immune system disorders. You may be advised to self-isolate up to 20 days after your symptoms first started. Your healthcare provider may want to retest you for COVID-19. Follow your provider's instructions.  CDC mask guidance  Consider the CDC's guidance and your local community's instructions on face masks.       Cloth masks may help prevent people who have COVID-19 from spreading the virus to others.    Cloth masks are most likely to reduce COVID-19 spread when masks are widely used by people who are out in the public.    Wear a mask inside your house if you live with someone who has symptoms of COVID-19 or has tested positive for COVID-19.    CDC advises all people older than 2 who are not fully vaccinated to wear a mask and stay 6 feet away from others while in public.    CDC advises people with weak immune systems, even if fully vaccinated, to continue wearing masks and to stay 6 feet away from others while in public.    CDC advises indoor masking for all teachers, staff, students, and visitors to schools, regardless of vaccination status.    Like other viruses, the virus that causes COVID-19 changes (mutates) all the time. This leads to variants that are easily  spread, including the delta variant. To protect against variants, CDC advises all people over age 2, including those who are fully vaccinated, to wear a mask indoors in public settings if you are in an area of high numbers of COVID-19 cases. See the [facility]'s county data website for current transmission information in your area.     Certain people should not wear a face mask. This includes:    Children younger than 2 years old    Anyone with a health, developmental, or mental health condition that can be made worse by wearing a mask    Anyone who is unconscious or unable to remove the face covering without help     See the CDC's mask guidance.  When to call your healthcare provider  Call your healthcare provider right away if a sick person has any of these:    Trouble breathing    Pain or pressure in chest  If a sick person has any of these, call 911:    Trouble breathing that gets worse    Pain or pressure in chest that gets worse    Blue tint to lips or face    Fast or irregular heartbeat    Confusion or trouble waking    Fainting or loss of consciousness    Coughing up blood  Going home from the hospital  If you were diagnosed with COVID-19 and were recently discharged from the hospital:    Follow the instructions above for self-care and isolation.    Follow the hospital healthcare team s specific instructions.    Ask questions if anything is unclear to you. Write down answers so you remember them.  Date last modified: 9/24/2021  Anjana last reviewed this educational content on 4/1/2020 2000-2021 [employer]. All rights reserved. This information is not intended as a substitute for professional medical care. Always follow your healthcare professional's instructions.

## 2021-12-01 NOTE — DISCHARGE SUMMARY
[facility] [place]  Discharge Summary  Hospitalist      Date of Admission:  11/28/2021  Date of Discharge:  12/1/2021  Provider:  Manisha Howard MD  Date of Service (when I last saw the patient): 12/01/21      Primary Provider: No Ref-Primary, Physician          Discharge Diagnosis:   Discharge Diagnoses   COVID-19 pneumonia.  With acute hypoxic respite failure.  Acute hypoxic respiratory failure.  Due to COVID-19 pneumonia.  Reported chronic methadone use.  Hypokalemia.  Iron deficiency anemia    Other medical issues:  No past medical history on file.       History of Present Illness   Nola Srinivasan is an 36 year old female who presented with shortness of breath. Please see the admission history and physical for full details.    Hospital Course     Nola Srinivasan was admitted on 11/28/2021. She is a 36-year-old female with a past medical history significant for chronic methadone use.  Not vaccinated for COVID-19.  Began having symptoms 11/22.  Initial positive SARS-CoV-2 PCR on 11/28.  Hospitalized on 11/28.     The following problems were addressed during her hospitalization:    1.  COVID-19 pneumonia.  With acute hypoxic respite failure.  -treated with dexamethasone 6 mg a day.  -received remdesivir for 2 doses after discussion  -Subcutaneous enoxaparin for DVT prophylaxis.  -Patient was off supplemental O2 for 24 hours and is being discharged home     2.  Acute hypoxic respiratory failure.  Due to COVID-19 pneumonia.  -Secondary to COVID-19     3.  Reported chronic methadone use.  -Pain management team consult.  -Resumed methadone after dose verified by pharmacy.     4.  Hypokalemia.  Potassium initially 3.2.  -Potassium replacement protocol.     5.  Iron deficiency anemia.  Hemoglobin 7.8 today.  -Iron studies will  -Continue ferrous sulfate.  -Patient will need close monitoring outpatient.  -Has heavy menstrual bleeding at this time  -Patient will need further evaluation and  follow-up    Is not being discharged on anticoagulation with lower hemoglobin and had lower D-dimer during hospitalization.  Discussed treatment plan with patient at length.      Significant Results and Procedures   As noted above    Pending Results   Unresulted Labs Ordered in the Past 30 Days of this Admission     No orders found from 10/29/2021 to 11/29/2021.          Code Status   Full Code       Primary Care Physician   Physician No Ref-Primary    Physical Exam   Temp: 97.6  F (36.4  C) Temp src: Oral BP: 94/67 Pulse: 52   Resp: 18 SpO2: 95 % O2 Device: None (Room air)    Vitals:    11/28/21 1609   Weight: 89.8 kg (198 lb)     Vital Signs with Ranges  Temp:  [97.5  F (36.4  C)-97.8  F (36.6  C)] 97.6  F (36.4  C)  Pulse:  [44-52] 52  Resp:  [16-18] 18  BP: ()/(57-71) 94/67  SpO2:  [91 %-96 %] 95 %  No intake/output data recorded.    Constitutional: Awake, alert, cooperative, no apparent distress, and appears stated age.  Respiratory: No increased work of breathing, good air exchange, clear to auscultation bilaterally, no crackles or wheezing.  Cardiovascular: Normal apical impulse,normal S1 and S2,   GI:  normal bowel sounds, soft, non-distended, non-tender.    Discharge Disposition   Discharged to home    Consultations This Hospital Stay   PAIN MANAGEMENT ADULT IP CONSULT  PAIN MANAGEMENT ADULT IP CONSULT  SPIRITUAL HEALTH SERVICES IP CONSULT    Time Spent on this Encounter   I, Manisha Howard MD, personally saw the patient today and spent greater than 30 minutes discharging this patient.    Discharge Orders      COVID-19 [facility] Referral      Reason for your hospital stay    Covid- 19 pneumonia     Contact provider    Contact your primary care provider if If increased trouble breathing, new arm/leg swelling, dizziness/passing out, falls, bleeding that doesn't stop, or uncontrolled pain.     Follow-up and recommended labs and tests     Follow up with primary care provider, Physician No  Ref-Primary, within 7 days to follow up on results.  The following labs/tests are recommended: Hemoglobin  Significant low hemoglobin suspect iron deficiency anemia cannot exclude other causes will need further evaluation.  Recommend outpatient follow-up with GI or hematologist.  Primary care provider to closely monitor hemoglobin and iron levels.  Please call or come if there are any new or worsening     Discharge - Quarantine/Isolation Instruction    Date of symptom onset:     Date of first positive test:    If you tested positive COVID-19 and show symptoms (fever, cough, body aches or trouble breathing):        Stay home and away from others (self-isolate) until:        At least 10 days have passed since your symptoms started. AND...        You've had no fever-and no medicine that reduces fever for 1 full day (24 hours). AND...         Your other symptoms have resolved (gotten better).  If you tested positive for COVID-19 but don't show symptoms:       Stay home and away from others (self-isolate) until at least 10 days have passed since the date of your first positive COVID-19 test.     Activity    Your activity upon discharge: activity as tolerated     Diet    Follow this diet upon discharge: Orders Placed This Encounter      Combination Diet Regular Diet Adult     Discharge Medications   Current Discharge Medication List      START taking these medications    Details   dexamethasone (DECADRON) 6 MG tablet Take 1 tablet (6 mg) by mouth daily for 2 days  Qty: 2 tablet, Refills: 0    Associated Diagnoses: Acute respiratory failure with hypoxia (H)      ferrous sulfate (FEROSUL) 325 (65 Fe) MG tablet Take 1 tablet (325 mg) by mouth daily  Qty: 30 tablet, Refills: 0    Associated Diagnoses: Other iron deficiency anemia      omeprazole (PRILOSEC) 20 MG DR capsule Take 1 capsule (20 mg) by mouth daily  Qty: 30 capsule, Refills: 0    Associated Diagnoses: Other iron deficiency anemia         CONTINUE these medications  which have CHANGED    Details   ibuprofen (ADVIL/MOTRIN) 200 MG tablet Take 1 tablet (200 mg) by mouth every 4 hours as needed for mild pain    Comments: HOLD WITH ANEMIA. UNTIL FURTHER EVALUATION BY PCP  Associated Diagnoses: Other iron deficiency anemia         CONTINUE these medications which have NOT CHANGED    Details   acetaminophen (TYLENOL) 325 MG tablet Take 325-650 mg by mouth every 6 hours as needed for mild pain      methadone (DOLOPHINE-INTENSOL) 10 MG/ML (HIGH CONC) solution Take 90 mg by mouth daily      Pseudoephedrine-DM-GG-APAP (DAYQUIL LIQUICAPS OR) Take 2 capsules by mouth every 4 hours as needed           Allergies   Allergies   Allergen Reactions     Penicillin G GI Disturbance     Data   Most Recent 3 CBC's:Recent Labs   Lab Test 12/01/21  0649 11/30/21  0642 11/29/21  0645   WBC 8.0 6.1 3.6*   HGB 7.8* 7.5* 7.3*   MCV 73* 74* 74*    284 267      Most Recent 3 BMP's:  Recent Labs   Lab Test 12/01/21  0649 11/30/21  0642 11/29/21  2053 11/29/21  1328 11/29/21  0645    143  --   --  138   POTASSIUM 3.8 3.9 3.7   < > 3.4   CHLORIDE 111* 110*  --   --  105   CO2 25 26  --   --  28   BUN 16 13  --   --  7   CR 0.63 0.64  --   --  0.59   ANIONGAP 5 7  --   --  5   MILLIE 7.9* 7.9*  --   --  8.0*   * 113*  --   --  120*    < > = values in this interval not displayed.     Most Recent 2 LFT's:  Recent Labs   Lab Test 12/01/21  0649 11/29/21  0046   AST 20 32   ALT 28 23   ALKPHOS 41 39*   BILITOTAL 0.5 0.5     Most Recent INR's and Anticoagulation Dosing History:  Anticoagulation Dose History    There is no flowsheet data to display.       Most Recent 3 Troponin's:  Recent Labs   Lab Test 11/28/21  1649   TROPONIN <0.015     Most Recent Cholesterol Panel:No lab results found.  Most Recent 6 Bacteria Isolates From Any Culture (See EPIC Reports for Culture Details):No lab results found.  Most Recent TSH, T4 and A1c Labs:No lab results found.  Results for orders placed or performed during  the hospital encounter of 11/28/21   CT Chest Pulmonary Embolism w Contrast    Narrative    EXAM: CT CHEST PULMONARY EMBOLISM W CONTRAST  LOCATION: [facility]  DATE/TIME: 11/28/2021 6:59 PM    INDICATION: PE suspected, low/intermediate prob, positive D-dimer, low O2 saturation, fever, shortness of breath, cough, myalgia, back pain  COMPARISON: None.  TECHNIQUE: CT chest pulmonary angiogram during arterial phase injection of IV contrast. Multiplanar reformats and MIP reconstructions were performed. Dose reduction techniques were used.   CONTRAST: 80mL Isovue-370    FINDINGS:  ANGIOGRAM CHEST: Pulmonary arteries are normal caliber and negative for pulmonary emboli. Thoracic aorta is negative for dissection. No CT evidence of right heart strain.    LUNGS AND PLEURA: Diffuse moderate to extensive areas of groundglass opacity and developing consolidation throughout both hemithoraces most prominent towards the lower lung fields. No effusion.    MEDIASTINUM/AXILLAE: Normal.    CORONARY ARTERY CALCIFICATION: None.    UPPER ABDOMEN: Normal.    MUSCULOSKELETAL: Normal.      Impression    IMPRESSION:  1.  Diffuse moderate to extensive areas of groundglass opacity and developing consolidation throughout both hemithoraces most prominent towards the lower lung fields most consistent with COVID pneumonia. Follow-up in 3 months recommended to ensure   resolution and exclude other pathology.  2.  No pulmonary embolus aortic aneurysm or dissection.    Commonly reported imaging features of (COVID-19) pneumonia are present. Influenza pneumonia and organizing pneumonia as can be seen in the setting of drug toxicity and connective tissue disease can cause a similar imaging pattern.           Disclaimer: This note consists of symbols derived from keyboarding, dictation and/or voice recognition software. As a result, there may be errors in the script that have gone undetected. Please consider this when interpreting  information found in this chart.

## 2021-12-01 NOTE — PLAN OF CARE
Patient alert and oriented, up independent in room. PRN tylenol given x1 for lower back pain, which was effected. VSS, patient tolerating room air, sats 94-96%. Also bradycardic. Rates 48-58, patient is completely asymptomatic. Will be having an overnight oximetry study done tonight. Possibly discharge to home tomorrow.

## 2021-12-01 NOTE — PROGRESS NOTES
SPIRITUAL HEALTH SERVICES Progress Note  [facility] - Adult Overflow    Attempted to see pt per staff referral requesting emotional support for pt.  Pt discharged moments before this author arrived to her room.    Jack Figueredo M.Div., [facility]  Staff   Phone [phone]